# Patient Record
Sex: FEMALE | Race: WHITE | Employment: OTHER | URBAN - METROPOLITAN AREA
[De-identification: names, ages, dates, MRNs, and addresses within clinical notes are randomized per-mention and may not be internally consistent; named-entity substitution may affect disease eponyms.]

---

## 2017-01-10 ENCOUNTER — HOSPITAL ENCOUNTER (OUTPATIENT)
Dept: INFUSION THERAPY | Age: 82
End: 2017-01-10

## 2017-01-10 ENCOUNTER — APPOINTMENT (OUTPATIENT)
Dept: INFUSION THERAPY | Age: 82
End: 2017-01-10

## 2017-03-14 PROBLEM — I50.9 CONGESTIVE HEART FAILURE (HCC): Status: ACTIVE | Noted: 2017-03-14

## 2017-03-14 PROBLEM — M81.0 OSTEOPOROSIS, POSTMENOPAUSAL: Status: ACTIVE | Noted: 2017-03-14

## 2017-03-14 PROBLEM — G89.4 CHRONIC PAIN SYNDROME: Status: ACTIVE | Noted: 2017-03-14

## 2017-09-20 ENCOUNTER — HOSPITAL ENCOUNTER (OUTPATIENT)
Dept: INFUSION THERAPY | Age: 82
Discharge: HOME | End: 2017-09-20
Payer: MEDICARE

## 2017-09-20 VITALS
SYSTOLIC BLOOD PRESSURE: 148 MMHG | HEART RATE: 56 BPM | DIASTOLIC BLOOD PRESSURE: 65 MMHG | RESPIRATION RATE: 18 BRPM | TEMPERATURE: 97.8 F | OXYGEN SATURATION: 97 %

## 2017-09-20 PROCEDURE — 96372 THER/PROPH/DIAG INJ SC/IM: CPT

## 2017-09-20 PROCEDURE — 74011250636 HC RX REV CODE- 250/636: Performed by: FAMILY MEDICINE

## 2017-09-20 RX ADMIN — DENOSUMAB 60 MG: 60 INJECTION SUBCUTANEOUS at 13:24

## 2017-09-20 NOTE — PROGRESS NOTES
Arrived to the [facility]. Prolia completed. Patient tolerated well. Any issues or concerns during appointment: None. Discharged in wheelchair accompanied by grand-daugher.

## 2017-12-08 PROBLEM — M81.0 OSTEOPOROSIS, POSTMENOPAUSAL: Status: RESOLVED | Noted: 2017-03-14 | Resolved: 2017-12-08

## 2018-04-02 ENCOUNTER — HOSPITAL ENCOUNTER (OUTPATIENT)
Dept: INFUSION THERAPY | Age: 83
Discharge: HOME | End: 2018-04-02
Payer: MEDICARE

## 2018-04-02 VITALS
SYSTOLIC BLOOD PRESSURE: 183 MMHG | DIASTOLIC BLOOD PRESSURE: 77 MMHG | TEMPERATURE: 97.9 F | RESPIRATION RATE: 18 BRPM | HEART RATE: 58 BPM

## 2018-04-02 PROCEDURE — 74011250636 HC RX REV CODE- 250/636

## 2018-04-02 PROCEDURE — 96372 THER/PROPH/DIAG INJ SC/IM: CPT

## 2018-04-02 RX ADMIN — DENOSUMAB 60 MG: 60 INJECTION SUBCUTANEOUS at 14:08

## 2018-04-02 NOTE — PROGRESS NOTES
Arrived to the [facility]. Prolia completed. Patient tolerated well. Any issues or concerns during appointment: none. No future appointments scheduled with infusion. Discharged via w/c.

## 2018-05-30 ENCOUNTER — ANESTHESIA EVENT (OUTPATIENT)
Dept: SURGERY | Age: 83
End: 2018-05-30
Payer: MEDICARE

## 2018-05-31 ENCOUNTER — APPOINTMENT (OUTPATIENT)
Dept: GENERAL RADIOLOGY | Age: 83
End: 2018-05-31
Attending: ORTHOPAEDIC SURGERY
Payer: MEDICARE

## 2018-05-31 ENCOUNTER — ANESTHESIA (OUTPATIENT)
Dept: SURGERY | Age: 83
End: 2018-05-31
Payer: MEDICARE

## 2018-05-31 ENCOUNTER — HOSPITAL ENCOUNTER (OUTPATIENT)
Age: 83
Setting detail: OUTPATIENT SURGERY
Discharge: HOME | End: 2018-05-31
Attending: ORTHOPAEDIC SURGERY | Admitting: ORTHOPAEDIC SURGERY
Payer: MEDICARE

## 2018-05-31 VITALS
WEIGHT: 150 LBS | TEMPERATURE: 99.7 F | BODY MASS INDEX: 26.58 KG/M2 | HEIGHT: 63 IN | DIASTOLIC BLOOD PRESSURE: 60 MMHG | OXYGEN SATURATION: 94 % | RESPIRATION RATE: 18 BRPM | SYSTOLIC BLOOD PRESSURE: 120 MMHG | HEART RATE: 74 BPM

## 2018-05-31 LAB — POTASSIUM BLD-SCNC: 3.8 MMOL/L (ref 3.5–5.1)

## 2018-05-31 PROCEDURE — A4565 SLINGS: HCPCS | Performed by: ORTHOPAEDIC SURGERY

## 2018-05-31 PROCEDURE — 74011250636 HC RX REV CODE- 250/636: Performed by: ORTHOPAEDIC SURGERY

## 2018-05-31 PROCEDURE — 77030003602 HC NDL NRV BLK BBMI -B: Performed by: ANESTHESIOLOGY

## 2018-05-31 PROCEDURE — 76010000160 HC OR TIME 0.5 TO 1 HR INTENSV-TIER 1: Performed by: ORTHOPAEDIC SURGERY

## 2018-05-31 PROCEDURE — 77030000031 HC BIT DRL QC SYNT -C: Performed by: ORTHOPAEDIC SURGERY

## 2018-05-31 PROCEDURE — 76010010054 HC POST OP PAIN BLOCK: Performed by: ORTHOPAEDIC SURGERY

## 2018-05-31 PROCEDURE — 77030025318 HC WRE K 1 SYNT -B: Performed by: ORTHOPAEDIC SURGERY

## 2018-05-31 PROCEDURE — 76210000063 HC OR PH I REC FIRST 0.5 HR: Performed by: ORTHOPAEDIC SURGERY

## 2018-05-31 PROCEDURE — 76060000033 HC ANESTHESIA 1 TO 1.5 HR: Performed by: ORTHOPAEDIC SURGERY

## 2018-05-31 PROCEDURE — C1713 ANCHOR/SCREW BN/BN,TIS/BN: HCPCS | Performed by: ORTHOPAEDIC SURGERY

## 2018-05-31 PROCEDURE — 74011250636 HC RX REV CODE- 250/636

## 2018-05-31 PROCEDURE — 77030000032 HC CUF TRNQT ZIMM -B: Performed by: ORTHOPAEDIC SURGERY

## 2018-05-31 PROCEDURE — 77030002986 HC SUT PROL J&J -A: Performed by: ORTHOPAEDIC SURGERY

## 2018-05-31 PROCEDURE — 84132 ASSAY OF SERUM POTASSIUM: CPT

## 2018-05-31 PROCEDURE — 76210000021 HC REC RM PH II 0.5 TO 1 HR: Performed by: ORTHOPAEDIC SURGERY

## 2018-05-31 PROCEDURE — 77030002933 HC SUT MCRYL J&J -A: Performed by: ORTHOPAEDIC SURGERY

## 2018-05-31 PROCEDURE — 76942 ECHO GUIDE FOR BIOPSY: CPT | Performed by: ORTHOPAEDIC SURGERY

## 2018-05-31 PROCEDURE — 74011250636 HC RX REV CODE- 250/636: Performed by: ANESTHESIOLOGY

## 2018-05-31 PROCEDURE — 77030018836 HC SOL IRR NACL ICUM -A: Performed by: ORTHOPAEDIC SURGERY

## 2018-05-31 PROCEDURE — 77030008367 HC SPLNT ORTHGLS BSNM -A: Performed by: ORTHOPAEDIC SURGERY

## 2018-05-31 PROCEDURE — 77030039266 HC ADH SKN EXOFIN S2SG -A: Performed by: ORTHOPAEDIC SURGERY

## 2018-05-31 PROCEDURE — 77030003862 HC BIT DRL SYNT -B: Performed by: ORTHOPAEDIC SURGERY

## 2018-05-31 PROCEDURE — 77030011884 HC TAPE CST PLSTR BSNM -A: Performed by: ORTHOPAEDIC SURGERY

## 2018-05-31 DEVICE — SCREW BNE L12MM DIA2.7MM CORT S STL ST LOK FULL THRD T8: Type: IMPLANTABLE DEVICE | Site: WRIST | Status: FUNCTIONAL

## 2018-05-31 DEVICE — 2.4MM VA-LCP 2-CLMN VLR DSTL RADIUS PL 6H HD/3H SHAFT/RIGHT
Type: IMPLANTABLE DEVICE | Site: WRIST | Status: FUNCTIONAL
Brand: VA-LCP

## 2018-05-31 DEVICE — 2.4MM VA LOCKING SCREW STARDRIVE 18MM: Type: IMPLANTABLE DEVICE | Site: WRIST | Status: FUNCTIONAL

## 2018-05-31 DEVICE — 2.7MM CORTEX SCREW SLF-TPNG WITH T8 STARDRIVE RECESS 14MM: Type: IMPLANTABLE DEVICE | Site: WRIST | Status: FUNCTIONAL

## 2018-05-31 DEVICE — SCREW BNE L12MM DIA2.4MM DST RAD VOLAR S STL ST VAR ANG LOK: Type: IMPLANTABLE DEVICE | Site: WRIST | Status: FUNCTIONAL

## 2018-05-31 RX ORDER — SODIUM CHLORIDE 0.9 % (FLUSH) 0.9 %
5-10 SYRINGE (ML) INJECTION AS NEEDED
Status: DISCONTINUED | OUTPATIENT
Start: 2018-05-31 | End: 2018-05-31 | Stop reason: HOSPADM

## 2018-05-31 RX ORDER — ACETAMINOPHEN 500 MG
1000 TABLET ORAL
Status: DISCONTINUED | OUTPATIENT
Start: 2018-05-31 | End: 2018-05-31 | Stop reason: HOSPADM

## 2018-05-31 RX ORDER — SODIUM CHLORIDE, SODIUM LACTATE, POTASSIUM CHLORIDE, CALCIUM CHLORIDE 600; 310; 30; 20 MG/100ML; MG/100ML; MG/100ML; MG/100ML
150 INJECTION, SOLUTION INTRAVENOUS CONTINUOUS
Status: DISCONTINUED | OUTPATIENT
Start: 2018-05-31 | End: 2018-05-31 | Stop reason: HOSPADM

## 2018-05-31 RX ORDER — HYDROMORPHONE HYDROCHLORIDE 2 MG/ML
0.5 INJECTION, SOLUTION INTRAMUSCULAR; INTRAVENOUS; SUBCUTANEOUS
Status: DISCONTINUED | OUTPATIENT
Start: 2018-05-31 | End: 2018-05-31 | Stop reason: HOSPADM

## 2018-05-31 RX ORDER — HYDROCODONE BITARTRATE AND ACETAMINOPHEN 5; 325 MG/1; MG/1
1 TABLET ORAL AS NEEDED
Status: DISCONTINUED | OUTPATIENT
Start: 2018-05-31 | End: 2018-05-31 | Stop reason: HOSPADM

## 2018-05-31 RX ORDER — FAMOTIDINE 20 MG/1
20 TABLET, FILM COATED ORAL ONCE
Status: DISCONTINUED | OUTPATIENT
Start: 2018-05-31 | End: 2018-05-31 | Stop reason: HOSPADM

## 2018-05-31 RX ORDER — CEFAZOLIN SODIUM/WATER 2 G/20 ML
2 SYRINGE (ML) INTRAVENOUS ONCE
Status: COMPLETED | OUTPATIENT
Start: 2018-05-31 | End: 2018-05-31

## 2018-05-31 RX ORDER — DEXAMETHASONE SODIUM PHOSPHATE 4 MG/ML
INJECTION, SOLUTION INTRA-ARTICULAR; INTRALESIONAL; INTRAMUSCULAR; INTRAVENOUS; SOFT TISSUE AS NEEDED
Status: DISCONTINUED | OUTPATIENT
Start: 2018-05-31 | End: 2018-05-31 | Stop reason: HOSPADM

## 2018-05-31 RX ORDER — SODIUM CHLORIDE 0.9 % (FLUSH) 0.9 %
5-10 SYRINGE (ML) INJECTION EVERY 8 HOURS
Status: DISCONTINUED | OUTPATIENT
Start: 2018-05-31 | End: 2018-05-31 | Stop reason: HOSPADM

## 2018-05-31 RX ORDER — FENTANYL CITRATE 50 UG/ML
100 INJECTION, SOLUTION INTRAMUSCULAR; INTRAVENOUS ONCE
Status: DISCONTINUED | OUTPATIENT
Start: 2018-05-31 | End: 2018-05-31 | Stop reason: HOSPADM

## 2018-05-31 RX ORDER — LIDOCAINE HYDROCHLORIDE 10 MG/ML
0.1 INJECTION INFILTRATION; PERINEURAL AS NEEDED
Status: DISCONTINUED | OUTPATIENT
Start: 2018-05-31 | End: 2018-05-31 | Stop reason: HOSPADM

## 2018-05-31 RX ORDER — SODIUM CHLORIDE 9 MG/ML
50 INJECTION, SOLUTION INTRAVENOUS CONTINUOUS
Status: DISCONTINUED | OUTPATIENT
Start: 2018-05-31 | End: 2018-05-31 | Stop reason: HOSPADM

## 2018-05-31 RX ORDER — MIDAZOLAM HYDROCHLORIDE 1 MG/ML
2 INJECTION, SOLUTION INTRAMUSCULAR; INTRAVENOUS
Status: DISCONTINUED | OUTPATIENT
Start: 2018-05-31 | End: 2018-05-31 | Stop reason: HOSPADM

## 2018-05-31 RX ADMIN — Medication 2 G: at 10:43

## 2018-05-31 RX ADMIN — SODIUM CHLORIDE, SODIUM LACTATE, POTASSIUM CHLORIDE, AND CALCIUM CHLORIDE 150 ML/HR: 600; 310; 30; 20 INJECTION, SOLUTION INTRAVENOUS at 08:00

## 2018-05-31 RX ADMIN — DEXAMETHASONE SODIUM PHOSPHATE 4 MG: 4 INJECTION, SOLUTION INTRA-ARTICULAR; INTRALESIONAL; INTRAMUSCULAR; INTRAVENOUS; SOFT TISSUE at 08:38

## 2018-05-31 NOTE — IP AVS SNAPSHOT
[address] 
[phone] Patient: Germaine Sainz MRN: [id number] :[id number] A check kathya indicates which time of day the medication should be taken. My Medications ASK your doctor about these medications Instructions Each Dose to Equal  
 Morning Noon Evening Bedtime  
 aspirin 81 mg chewable tablet Your last dose was: Your next dose is: Take 81 mg by mouth every other day. 81 mg  
    
   
   
   
  
 atorvastatin 20 mg tablet Commonly known as:  LIPITOR Your last dose was: Your next dose is: Take 1 Tab by mouth daily. 20 mg  
    
   
   
   
  
 calcium carbonate 500 mg calcium (1,250 mg) tablet Commonly known as:  OS-EMERITA Your last dose was: Your next dose is: Take  by mouth two (2) times a day. denosumab 60 mg/mL injection Commonly known as:  Venkatesh Álvareza Your last dose was: Your next dose is:    
   
   
 1 mL by SubCUTAneous route once for 1 dose. Every 6 months, last shot 16        May draw labs if needed     Original order is good for 6 mths  Indications: POST-MENOPAUSAL OSTEOPOROSIS  
 60 mg  
    
   
   
   
  
 furosemide 20 mg tablet Commonly known as:  LASIX Your last dose was: Your next dose is: Take 1 tablet daily, alternate with 1/2 tablet daily. levothyroxine 50 mcg tablet Commonly known as:  SYNTHROID Your last dose was: Your next dose is: Take 1 Tab by mouth Daily (before breakfast). 50 mcg  
    
   
   
   
  
 lisinopril 10 mg tablet Commonly known as:  Juliana Drought Your last dose was: Your next dose is: Take 1 Tab by mouth daily. 10 mg LORazepam 0.5 mg tablet Commonly known as:  ATIVAN  
   
 Your last dose was: Your next dose is: Take 1 Tab by mouth nightly. Max Daily Amount: 0.5 mg.  
 0.5 mg  
    
   
   
   
  
 magnesium 250 mg Tab Your last dose was: Your next dose is: Take  by mouth.  
     
   
   
   
  
 metoprolol tartrate 25 mg tablet Commonly known as:  LOPRESSOR Your last dose was: Your next dose is: Take 1 Tab by mouth two (2) times a day. 25 mg  
    
   
   
   
  
 montelukast 10 mg tablet Commonly known as:  SINGULAIR Your last dose was: Your next dose is: Take 1 Tab by mouth nightly. 10 mg  
    
   
   
   
  
 omeprazole 20 mg capsule Commonly known as:  PRILOSEC Your last dose was: Your next dose is: Take 1 Cap by mouth daily. 20 mg  
    
   
   
   
  
 potassium chloride 20 mEq tablet Commonly known as:  K-DUR, KLOR-CON Your last dose was: Your next dose is: Take 1 Tab by mouth daily. 20 mEq VENTOLIN HFA 90 mcg/actuation inhaler Generic drug:  albuterol Your last dose was: Your next dose is: Take 2 Puffs by inhalation every four (4) hours as needed. 2 Puff VITAMIN D3 1,000 unit Cap Generic drug:  cholecalciferol Your last dose was: Your next dose is: Take 1 Tab by mouth daily. 1 Tab  
    
   
   
   
  
 vitamin E acetate 400 unit Cap capsule Your last dose was: Your next dose is: Take  by mouth daily.

## 2018-05-31 NOTE — DISCHARGE INSTRUCTIONS
· ACTIVITY  ·  Must wear sling for support and comfort. · Elevate, ice and wiggle fingers. · May bathe or shower but must keep dressing dry and intact. DIET  · Clear liquids until no nausea or vomiting; then light diet for the first day. · Advance to regular diet on second day, unless your doctor orders otherwise. · If nausea and vomiting continues, call your doctor. · Avoid greasy and spicy food today to reduce nausea. PAIN  · Take pain medication as directed by your doctor. · Call your doctor if pain is NOT relieved by medication. · DO NOT take aspirin of blood thinners unless directed by your doctor. · Take pain pills with food to reduce nausea  · Pain pills may cause constipation. May use stool softener. · Begin taking pain pills when sensation returns or at bedtime even if still numb    DRESSING CARE : Keep splint dry and intact. Wearing a Cast: Care Instructions  Your Care Instructions  A cast protects a broken bone or other injury. Most casts are made of fiberglass, but plaster casts are still sometimes used. Once a cast is on, you can't remove it yourself. Your doctor will take it off. Follow-up care is a key part of your treatment and safety. Be sure to make and go to all appointments, and call your doctor if you are having problems. It's also a good idea to know your test results and keep a list of the medicines you take. How can you care for yourself at home? General care  · Follow your doctor's instructions for when you can first put weight on the cast. Fiberglass casts dry quickly and are soon ready to bear weight. But plaster casts may take several days before they are hard enough to use. When it's okay to put weight on your cast, do not stand or walk on it unless it is designed for walking. · Prop up the injured arm or leg on a pillow anytime you sit or lie down during the next 3 days. Try to keep it above the level of your heart. This will help reduce swelling.   · If the fingers or toes on the limb with the cast were not injured, wiggle them every now and then. This helps move the blood and fluids in the injured limb. · Be safe with medicines. Read and follow all instructions on the label. ¨ If the doctor gave you a prescription medicine for pain, take it as prescribed. ¨ If you are not taking a prescription pain medicine, ask your doctor if you can take an over-the-counter medicine. · Keep up your muscle strength and tone as much as you can while protecting your injured limb or joint. Your doctor may want you to tense and relax the muscles protected by the cast. Check with your doctor or your physical or occupational therapist for instructions. Water and your cast  · Keep your cast dry. · Tape a sheet of plastic to cover your cast when you take a shower or bath or when you have any other contact with water. Moisture can collect under the cast and cause skin irritation and itching. It can make infection more likely if you have had surgery or have a wound under the cast.  Cast and skin care  · Try blowing cool air from a hair dryer or fan into the cast to help relieve itching. Never stick items under your cast to scratch the skin. · Don't use oils or lotions near your cast. If the skin gets red or irritated around the edge of the cast, you may pad the edges with a soft material or use tape to cover them. When should you call for help? Call your doctor now or seek immediate medical care if:  ? · You have increased or severe pain. ? · You feel a warm or painful spot under the cast.   ? · You have problems with your cast. For example:  ¨ The skin under the cast burns or stings. ¨ The cast feels too tight. ¨ There is a lot of swelling near the cast. (Some swelling is normal.)  ¨ You have a new fever. ¨ There is drainage or a bad smell coming from the cast.   ? · Your foot or hand is cool or pale or changes color. ? · You have trouble moving your fingers or toes.    ? · You have symptoms of a blood clot in your arm or leg (called a deep vein thrombosis). These may include:  ¨ Pain in the arm, calf, back of the knee, thigh, or groin. ¨ Redness and swelling in the arm, leg, or groin. ? Watch closely for changes in your health, and be sure to contact your doctor if:  ? · The cast is breaking apart. ? · You are not getting better as expected. Where can you learn more? Go to http://vincent-anson.info/. Enter 454 [id number] in the search box to learn more about \"Wearing a Cast: Care Instructions. \"  Current as of: March 21, 2017  Content Version: 11.4  © 9525-7239 [employer]. Care instructions adapted under license by [employer] (which disclaims liability or warranty for this information). If you have questions about a medical condition or this instruction, always ask your healthcare professional. [place] [address] any warranty or liability for your use of this information. CALL YOUR DOCTOR IF   · Excessive bleeding that does not stop after holding pressure over the area  · Temperature of 101 degrees F or above  · Excessive redness, swelling or bruising, and/ or green or yellow, smelly discharge from incision    AFTER ANESTHESIA   · For the first 24 hours: DO NOT Drive, Drink alcoholic beverages, or Make important decisions. · Be aware of dizziness following anesthesia and while taking pain medication. APPOINTMENT DATE/ TIME: June 13, 2018 at 10:45 a.mm.  At [place] [address] office    Kristy Lynch DOCTOR'S PHONE NUMBER [phone]      DISCHARGE SUMMARY from Nurse    PATIENT INSTRUCTIONS:    After general anesthesia or intravenous sedation, for 24 hours or while taking prescription Narcotics:  · Limit your activities  · Do not drive and operate hazardous machinery  · Do not make important personal or business decisions  · Do  not drink alcoholic beverages  · If you have not urinated within 8 hours after discharge, please contact your surgeon on call. *  Please give a list of your current medications to your Primary Care Provider. *  Please update this list whenever your medications are discontinued, doses are      changed, or new medications (including over-the-counter products) are added. *  Please carry medication information at all times in case of emergency situations. These are general instructions for a healthy lifestyle:    No smoking/ No tobacco products/ Avoid exposure to second hand smoke    Surgeon General's Warning:  Quitting smoking now greatly reduces serious risk to your health. Obesity, smoking, and sedentary lifestyle greatly increases your risk for illness    A healthy diet, regular physical exercise & weight monitoring are important for maintaining a healthy lifestyle    You may be retaining fluid if you have a history of heart failure or if you experience any of the following symptoms:  Weight gain of 3 pounds or more overnight or 5 pounds in a week, increased swelling in our hands or feet or shortness of breath while lying flat in bed. Please call your doctor as soon as you notice any of these symptoms; do not wait until your next office visit. Recognize signs and symptoms of STROKE:    F-face looks uneven    A-arms unable to move or move unevenly    S-speech slurred or non-existent    T-time-call 911 as soon as signs and symptoms begin-DO NOT go       Back to bed or wait to see if you get better-TIME IS BRAIN.

## 2018-05-31 NOTE — ANESTHESIA PROCEDURE NOTES
Peripheral Block    Start time: 5/31/2018 8:30 AM  End time: 5/31/2018 8:38 AM  Performed by: Abelardo Combs  Authorized by: Abelardo Combs       Pre-procedure: Indications: at surgeon's request and post-op pain management    Preanesthetic Checklist: patient identified, risks and benefits discussed, site marked, timeout performed, anesthesia consent given and patient being monitored    Timeout Time: 08:30          Block Type:   Block Type:  Supraclavicular  Laterality:  Right  Monitoring:  Standard ASA monitoring, responsive to questions, continuous pulse ox, oxygen, frequent vital sign checks and heart rate  Injection Technique:  Single shot  Procedures: ultrasound guided and nerve stimulator    Patient Position: seated  Prep: chlorhexidine    Location:  Supraclavicular  Needle Type:  Stimuplex  Needle Gauge:  22 G  Needle Localization:  Nerve stimulator and ultrasound guidance  Motor Response: minimal motor response >0.4 mA    Medication Injected:  0.5%  ropivacaine  Adds:  Epi 1:200K  Volume (mL):  30    Assessment:  Number of attempts:  1  Injection Assessment:  Incremental injection every 5 mL, negative aspiration for CSF, local visualized surrounding nerve on ultrasound, negative aspiration for blood, no paresthesia, no intravascular symptoms and ultrasound image on chart  Patient tolerance:  Patient tolerated the procedure well with no immediate complications  All needles out intact, procedure tolerated well.

## 2018-05-31 NOTE — ANESTHESIA PREPROCEDURE EVALUATION
Anesthetic History   No history of anesthetic complications            Review of Systems / Medical History  Patient summary reviewed and pertinent labs reviewed    Pulmonary    COPD      Smoker (uit 25 years.)         Neuro/Psych   Within defined limits           Cardiovascular    Hypertension: well controlled        Dysrhythmias : atrial fibrillation  Hyperlipidemia    Exercise tolerance: <4 METS     GI/Hepatic/Renal     GERD: well controlled           Endo/Other      Hypothyroidism: well controlled       Other Findings              Physical Exam    Airway  Mallampati: II  TM Distance: 4 - 6 cm  Neck ROM: normal range of motion   Mouth opening: Diminished (comment)     Cardiovascular    Rhythm: regular  Rate: normal    Murmur: Grade 2, Aortic area     Dental    Dentition: Full lower dentures, Full upper dentures and Edentulous     Pulmonary  Breath sounds clear to auscultation               Abdominal         Other Findings            Anesthetic Plan    ASA: 3  Anesthesia type: total IV anesthesia      Post-op pain plan if not by surgeon: peripheral nerve block single    Induction: Intravenous  Anesthetic plan and risks discussed with: Patient and Son / Daughter

## 2018-05-31 NOTE — ANESTHESIA POSTPROCEDURE EVALUATION
Post-Anesthesia Evaluation and Assessment    Patient: Frank Whelan MRN: [id number]  SSN: [id number]    YOB: 1925  Age: 80 y.o. Sex: female       Cardiovascular Function/Vital Signs  Visit Vitals    /68    Pulse 76    Temp 37.6 °C (99.7 °F)    Resp 17    Ht 5' 3\" (1.6 m)    Wt 68 kg (150 lb)    SpO2 93%    BMI 26.57 kg/m2       Patient is status post total IV anesthesia anesthesia for Procedure(s):  RIGHT DISTAL RADIUS OPEN REDUCTION INTERNAL FIXATION . Nausea/Vomiting: None    Postoperative hydration reviewed and adequate. Pain:  Pain Scale 1: Numeric (0 - 10) (05/31/18 1144)  Pain Intensity 1: 0 (05/31/18 1144)   Managed    Neurological Status:   Neuro (WDL): Exceptions to WDL (05/31/18 1144)  Neuro  Neurologic State: Alert (05/31/18 1144)  LUE Motor Response: Purposeful (05/31/18 1144)  LLE Motor Response: Purposeful (05/31/18 1144)  RUE Motor Response: Numbness; Pharmocologically paralyzed (05/31/18 1144)  RLE Motor Response: Purposeful (05/31/18 1144)   At baseline    Mental Status and Level of Consciousness: Arousable    Pulmonary Status:   O2 Device: Room air (05/31/18 1157)   Adequate oxygenation and airway patent    Complications related to anesthesia: None    Post-anesthesia assessment completed.  No concerns    Signed By: Ana Smith MD     May 31, 2018

## 2018-06-11 NOTE — OP NOTES
Operative Report       DOS:  5/31/18    Preoperative diagnosis:  Closed fracture distal radius and ulna, right, initial encounter [S52.190A, S52.476T]    Postoperative diagnosis: Closed Fracture Right Distal Radius     Surgeon(s) and Role:     * Lorelei Singletary MD - Primary     Anesthesia: Regional Local with MAC. Procedures: Procedure(s):  RIGHT DISTAL RADIUS OPEN REDUCTION INTERNAL FIXATION  2 PART INTRA ARTICULAR    EBL/IV FLUIDS: Per Anesthesia. COMPLICATIONS: None. DISPOSITION: Stable to recovery room. INDICATIONS FOR PROCEDURE: The patient is a pleasant 80year-old female with history of right displaced distal radius fracture. After both operative and nonoperative treatment options were discussed, the decision was made to go ahead with a right distal radius ORIF. Risks and benefits of the procedure were discussed including, but not limited to bleeding, infection, injury to adjacent structures consisting of tendon, artery, and nerve, need for additional procedures, wound dehiscence, scar formation, failure of hardware, need for removal of hardware, irritation of tendon from hardware, malunion, nonunion, incomplete resolution of symptoms, recurrence of symptoms, and transient neuropraxia. Informed consent was obtained. PROCEDURE IN DETAIL: The patient was seen and marked in the preoperative suite. The patient was taken back to the OR, placed on the table in supine position with right upper extremities on hand tables. Right upper extremities were prepped and draped in standard sterile fashion. A formal timeout was performed confirming patient identification, preoperative antibiotics, and planned operative procedure. Prior to this the patient underwent a nerve block. We exsanguinated the left upper extremity and the tourniquet was placed up to 250 mmHg. a standard FCR incision was made dissecting down through the FCR bed.   Retracted the FPL and incised the pronator quadratus and standard L fashion. This was indeed an intra-articular split the articular surface was reduced. Standard distal radius plate placed. Pins were placed subchondral an adequate reduction of the fracture was made. Non-locking screw for the oblong screw hole. Variable angle locking screws for the distal screws. Locking screws for the remainder of the shaft screws. We did perform a Z lengthening brachial radialis release to facilitate reduction. Final radiographs show adequate reduction of the fracture and placement of the hardware. We irrigated copiously with normal saline. Closed the brachioradialis repair. Closed with subcutaneous Monocryl and a running subcuticular Prolene and Dermabond glue. Soft sterile dressing was placed a short volar wrist splint was placed since the DRUJ was stable. Packet was let down and fingers pinked up nicely. Patient was taken to the recovery room having tolerated the procedure well. POSTOPERATIVE CARE: Early motion. No heavy lifting.  Followup in 2 weeks for suture removal.    Closure: Primary    Complications: None     Signed By: Martina Lance MD

## 2018-06-20 RX ORDER — CEFAZOLIN SODIUM/WATER 2 G/20 ML
2 SYRINGE (ML) INTRAVENOUS ONCE
Status: CANCELLED | OUTPATIENT
Start: 2018-06-20 | End: 2018-06-20

## 2018-06-26 ENCOUNTER — HOSPITAL ENCOUNTER (OUTPATIENT)
Dept: SURGERY | Age: 83
Discharge: HOME | End: 2018-06-26
Payer: MEDICARE

## 2018-06-26 ENCOUNTER — HOSPITAL ENCOUNTER (OUTPATIENT)
Dept: PHYSICAL THERAPY | Age: 83
Discharge: HOME | End: 2018-06-26
Payer: MEDICARE

## 2018-06-26 VITALS
BODY MASS INDEX: 24.8 KG/M2 | DIASTOLIC BLOOD PRESSURE: 70 MMHG | OXYGEN SATURATION: 95 % | TEMPERATURE: 96.7 F | HEART RATE: 60 BPM | WEIGHT: 140 LBS | SYSTOLIC BLOOD PRESSURE: 147 MMHG | HEIGHT: 63 IN

## 2018-06-26 LAB
ALBUMIN SERPL-MCNC: 3.3 G/DL (ref 3.2–4.6)
ANION GAP SERPL CALC-SCNC: 9 MMOL/L (ref 7–16)
APPEARANCE UR: CLEAR
APTT PPP: 31.9 SEC (ref 23.2–35.3)
ATRIAL RATE: 56 BPM
BACTERIA SPEC CULT: ABNORMAL
BACTERIA URNS QL MICRO: ABNORMAL /HPF
BASOPHILS # BLD: 0 K/UL (ref 0–0.2)
BASOPHILS NFR BLD: 0 % (ref 0–2)
BILIRUB UR QL: NEGATIVE
BUN SERPL-MCNC: 17 MG/DL (ref 8–23)
CALCIUM SERPL-MCNC: 9.3 MG/DL (ref 8.3–10.4)
CALCULATED P AXIS, ECG09: 70 DEGREES
CALCULATED R AXIS, ECG10: -34 DEGREES
CALCULATED T AXIS, ECG11: 0 DEGREES
CASTS URNS QL MICRO: ABNORMAL /LPF
CHLORIDE SERPL-SCNC: 102 MMOL/L (ref 98–107)
CO2 SERPL-SCNC: 28 MMOL/L (ref 21–32)
COLOR UR: YELLOW
CREAT SERPL-MCNC: 1.61 MG/DL (ref 0.6–1)
DIAGNOSIS, 93000: NORMAL
DIFFERENTIAL METHOD BLD: ABNORMAL
EOSINOPHIL # BLD: 0.2 K/UL (ref 0–0.8)
EOSINOPHIL NFR BLD: 2 % (ref 0.5–7.8)
EPI CELLS #/AREA URNS HPF: ABNORMAL /HPF
ERYTHROCYTE [DISTWIDTH] IN BLOOD BY AUTOMATED COUNT: 14.7 % (ref 11.9–14.6)
EST. AVERAGE GLUCOSE BLD GHB EST-MCNC: 111 MG/DL
GLUCOSE SERPL-MCNC: 93 MG/DL (ref 65–100)
GLUCOSE UR STRIP.AUTO-MCNC: NEGATIVE MG/DL
HBA1C MFR BLD: 5.5 % (ref 4.8–6)
HCT VFR BLD AUTO: 39.9 % (ref 35.8–46.3)
HGB BLD-MCNC: 12.9 G/DL (ref 11.7–15.4)
HGB UR QL STRIP: NEGATIVE
IMM GRANULOCYTES # BLD: 0 K/UL (ref 0–0.5)
IMM GRANULOCYTES NFR BLD AUTO: 0 % (ref 0–5)
INR PPP: 1
KETONES UR QL STRIP.AUTO: NEGATIVE MG/DL
LEUKOCYTE ESTERASE UR QL STRIP.AUTO: ABNORMAL
LYMPHOCYTES # BLD: 2 K/UL (ref 0.5–4.6)
LYMPHOCYTES NFR BLD: 26 % (ref 13–44)
MCH RBC QN AUTO: 28.9 PG (ref 26.1–32.9)
MCHC RBC AUTO-ENTMCNC: 32.3 G/DL (ref 31.4–35)
MCV RBC AUTO: 89.3 FL (ref 79.6–97.8)
MONOCYTES # BLD: 0.5 K/UL (ref 0.1–1.3)
MONOCYTES NFR BLD: 7 % (ref 4–12)
NEUTS SEG # BLD: 5.1 K/UL (ref 1.7–8.2)
NEUTS SEG NFR BLD: 65 % (ref 43–78)
NITRITE UR QL STRIP.AUTO: NEGATIVE
P-R INTERVAL, ECG05: 172 MS
PH UR STRIP: 5.5 [PH] (ref 5–9)
PLATELET # BLD AUTO: 239 K/UL (ref 150–450)
PMV BLD AUTO: 11.7 FL (ref 10.8–14.1)
POTASSIUM SERPL-SCNC: 4.6 MMOL/L (ref 3.5–5.1)
PROT UR STRIP-MCNC: NEGATIVE MG/DL
PROTHROMBIN TIME: 13.6 SEC (ref 11.5–14.5)
Q-T INTERVAL, ECG07: 424 MS
QRS DURATION, ECG06: 102 MS
QTC CALCULATION (BEZET), ECG08: 409 MS
RBC # BLD AUTO: 4.47 M/UL (ref 4.05–5.25)
RBC #/AREA URNS HPF: ABNORMAL /HPF
SERVICE CMNT-IMP: ABNORMAL
SODIUM SERPL-SCNC: 139 MMOL/L (ref 136–145)
SP GR UR REFRACTOMETRY: 1.01 (ref 1–1.02)
UROBILINOGEN UR QL STRIP.AUTO: 0.2 EU/DL (ref 0.2–1)
VENTRICULAR RATE, ECG03: 56 BPM
WBC # BLD AUTO: 7.9 K/UL (ref 4.3–11.1)
WBC URNS QL MICRO: ABNORMAL /HPF

## 2018-06-26 PROCEDURE — 93005 ELECTROCARDIOGRAM TRACING: CPT | Performed by: ORTHOPAEDIC SURGERY

## 2018-06-26 PROCEDURE — 36415 COLL VENOUS BLD VENIPUNCTURE: CPT | Performed by: ORTHOPAEDIC SURGERY

## 2018-06-26 PROCEDURE — 87086 URINE CULTURE/COLONY COUNT: CPT | Performed by: ORTHOPAEDIC SURGERY

## 2018-06-26 PROCEDURE — 85730 THROMBOPLASTIN TIME PARTIAL: CPT | Performed by: ORTHOPAEDIC SURGERY

## 2018-06-26 PROCEDURE — G8980 MOBILITY D/C STATUS: HCPCS

## 2018-06-26 PROCEDURE — 87088 URINE BACTERIA CULTURE: CPT | Performed by: ORTHOPAEDIC SURGERY

## 2018-06-26 PROCEDURE — G8978 MOBILITY CURRENT STATUS: HCPCS

## 2018-06-26 PROCEDURE — 80307 DRUG TEST PRSMV CHEM ANLYZR: CPT | Performed by: ORTHOPAEDIC SURGERY

## 2018-06-26 PROCEDURE — 83036 HEMOGLOBIN GLYCOSYLATED A1C: CPT | Performed by: ORTHOPAEDIC SURGERY

## 2018-06-26 PROCEDURE — 85025 COMPLETE CBC W/AUTO DIFF WBC: CPT | Performed by: ORTHOPAEDIC SURGERY

## 2018-06-26 PROCEDURE — 87641 MR-STAPH DNA AMP PROBE: CPT | Performed by: ORTHOPAEDIC SURGERY

## 2018-06-26 PROCEDURE — 85610 PROTHROMBIN TIME: CPT | Performed by: ORTHOPAEDIC SURGERY

## 2018-06-26 PROCEDURE — G8979 MOBILITY GOAL STATUS: HCPCS

## 2018-06-26 PROCEDURE — 81001 URINALYSIS AUTO W/SCOPE: CPT | Performed by: ORTHOPAEDIC SURGERY

## 2018-06-26 PROCEDURE — 87186 SC STD MICRODIL/AGAR DIL: CPT | Performed by: ORTHOPAEDIC SURGERY

## 2018-06-26 PROCEDURE — 97161 PT EVAL LOW COMPLEX 20 MIN: CPT

## 2018-06-26 PROCEDURE — 82040 ASSAY OF SERUM ALBUMIN: CPT | Performed by: ORTHOPAEDIC SURGERY

## 2018-06-26 PROCEDURE — 80048 BASIC METABOLIC PNL TOTAL CA: CPT | Performed by: ORTHOPAEDIC SURGERY

## 2018-06-26 PROCEDURE — 77030027138 HC INCENT SPIROMETER -A

## 2018-06-26 RX ORDER — THEOPHYLLINE 300 MG/1
300 TABLET, EXTENDED RELEASE ORAL 2 TIMES DAILY
COMMUNITY
End: 2019-07-11

## 2018-06-26 RX ORDER — ASPIRIN 81 MG/1
81 TABLET ORAL EVERY OTHER DAY
COMMUNITY
End: 2018-07-12

## 2018-06-26 NOTE — PROGRESS NOTES
Frank Whelan  : (47 y.o.) [facility]  [address], [address]  [phone]       Physical Therapy Prehab Plan of Treatment and Evaluation Summary:2018    ICD-10: Treatment Diagnosis:   · Pain in Right Knee (M25.561)  · Stiffness of Right Knee, Not elsewhere classified (M25.661)  · Difficulty in walking, Not elsewhere classified (R26.2)  · Other abnormalities of gait and mobility (R26.89)  Precautions/Allergies:   Codeine; Phenobarbital; and Tramadol  MEDICAL/REFERRING DIAGNOSIS:  Unilateral primary osteoarthritis, right knee [M17.11]  REFERRING PHYSICIAN: Tobias Hernandez MD  DATE OF SURGERY: 18   Assessment:   Comments:  Pain in right knee joint with decreased independence with functional mobility. Pt plans to go to rehab following hospital stay. Pt was walking with a walker prior to a fall in May of this year. Pt's daughter and granddaughter are present and supportive. PROBLEM LIST (Impacting functional limitations):  Ms. Parth Lipscomb presents with the following right lower extremity(s) problems:  1. Transfers  2. Gait  3. Strength  4. Range of Motion  5. Pain   INTERVENTIONS PLANNED:  1. Home Exercise Program  2. Educational Discussion     TREATMENT PLAN: Effective Dates: 2018 TO 2018. Frequency/Duration: Patient to continue to perform home exercise program at least twice per day up until her surgery. GOALS: (Goals have been discussed and agreed upon with patient.)  Discharge Goals: Time Frame: 1 Day  1. Patient will demonstrate independence with a home exercise program designed to increase strength, range of motion and pain control to minimize functional deficits and optimize patient for total joint replacement. Rehabilitation Potential For Stated Goals: Good  Regarding Nolvia Vila's therapy, I certify that the treatment plan above will be carried out by a therapist or under their direction.   Thank you for this referral,  Drinda Cockayne, PT               HISTORY:   Present Symptoms:  Pain Intensity 1: 5  Pain Location 1: Knee  Pain Orientation 1: Right   History of Present Injury/Illness (Reason for Referral):  Medical/Referring Diagnosis: Unilateral primary osteoarthritis, right knee [M17.11]   Past Medical History/Comorbidities:   Ms. Mitch Malik  has a past medical history of Breast CA ([facility] [address] Cardiac arrhythmia (03/2013); Chronic obstructive pulmonary disease (HCC); GERD (gastroesophageal reflux disease); Hypertension; Migraine; Oxygen dependent; and Thyroid disease. She also has no past medical history of Adverse effect of anesthesia; Difficult intubation; Malignant hyperthermia due to anesthesia; Nausea & vomiting; or Pseudocholinesterase deficiency. Ms. Mitch Malik  has a past surgical history that includes hx hysterectomy; hx appendectomy; hx splenectomy; hx cholecystectomy; and hx breast lumpectomy (Right, 10/31/2012).   Social History/Living Environment:   Home Environment: Private residence  # Steps to Enter: 1  One/Two Story Residence: One story  Support Systems: Child(eric)  Patient Expects to be Discharged to[de-identified] Rehabilitation facility  Current DME Used/Available at Home: Walker, rolling, Cane, straight, Commode, bedside  Tub or Shower Type: Tub/Shower combination  Work/Activity:  retired  Dominant Side:  RIGHT  Current Medications:  See Pre-assessment nursing note   Number of Personal Factors/Comorbidities that affect the Plan of Care: 0: LOW COMPLEXITY   EXAMINATION:   ADLs (Current Functional Status):   Ambulation:  [] Independent  [] Walk Indoors Only  [] Walk Outdoors  [] Use Assistive Device  [x] Use Wheelchair Only Dressing:  [] Independent  Requires Assistance from Someone for:  [] Sock/Shoes  [x] Pants  [] Everything   Bathing/Showering:   [x] Independent  [] Requires Assistance from Someone  [] [address]:  [] Routine house and yard work  [] Light Housework Only  [x] None Observation/Orthostatic Postural Assessment:   Within defined limits   ROM/Flexibility:   Gross Assessment: Yes  AROM: Generally decreased, functional                LLE Assessment  LLE Assessment (WDL): Within defined limits      RLE AROM  R Knee Flexion: 85  R Knee Extension: 25   Strength:   Gross Assessment: Yes  Strength: Generally decreased, functional                  Functional Mobility:    Gross Assessment: Yes  Coordination: Generally decreased, functional    Gait Description (WDL): Exceptions to WDL  Stand to Sit: Modified independent  Sit to Stand: Modified independent  Distance (ft): 1 Feet (ft) (in wheelchair; pt does not walk due to pain)  Ambulation - Level of Assistance: Modified independent  Gait Abnormalities: Antalgic          Balance:    Sitting: Intact  Standing: Pull to stand, With support   Body Structures Involved:  1. Bones  2. Joints  3. Muscles  4. Ligaments Body Functions Affected:  1. Neuromusculoskeletal  2. Movement Related Activities and Participation Affected:  1. Mobility   Number of elements that affect the Plan of Care: 4+: HIGH COMPLEXITY   CLINICAL PRESENTATION:   Presentation: Stable and uncomplicated: LOW COMPLEXITY   CLINICAL DECISION MAKING:   Outcome Measure: Tool Used: Lower Extremity Functional Scale (LEFS)  Score:  Initial: 11/80 Most Recent: X/80 (Date: -- )   Interpretation of Score: 20 questions each scored on a 5 point scale with 0 representing \"extreme difficulty or unable to perform\" and 4 representing \"no difficulty\". The lower the score, the greater the functional disability. 80/80 represents no disability. Minimal detectable change is 9 points. Score 80 79-65 64-49 48-33 32-17 16-1 0   Modifier CH CI CJ CK CL CM CN     ?  Mobility - Walking and Moving Around:     - CURRENT STATUS: CM - 80%-99% impaired, limited or restricted    - GOAL STATUS: CM - 80%-99% impaired, limited or restricted    - D/C STATUS:  CM - 80%-99% impaired, limited or restricted  Medical Necessity:   · Ms. Baudilio Woods is expected to optimize her lower extremity strength and ROM in preparation for joint replacement surgery. Reason for Services/Other Comments:  · Achieve baseline assesment of musculoskeletal system, functional mobility and home environment. , educate in PT HEP in preparation for surgery, educate in hospital plan of care. Use of outcome tool(s) and clinical judgement create a POC that gives a: Clear prediction of patient's progress: LOW COMPLEXITY   TREATMENT:   Treatment/Session Assessment:  Patient was instructed in PT- HEP to increase strength and ROM in LEs. Answered all questions. · Post session pain:  5  · Compliance with Program/Exercises: compliant most of the time.   Total Treatment Duration:  PT Patient Time In/Time Out  Time In: 1215  Time Out: [address], PT

## 2018-06-26 NOTE — PROGRESS NOTES
18 1200   Oxygen Therapy   O2 Sat (%) 96 %   Pulse via Oximetry 55 beats per minute   O2 Device Room air   Pre-Treatment   Cough Non-productive   Breath Sounds Bilateral Diminished   Pre FEV1 (liters) 0.5 liters   % Predicted 31  (before 2 puffs albuterol MDI)   Post-Treatment   Post FEV1 (liters) 0.7 liters   % Predicted 39  (after 2 puffs albuterol MDI)   Incentive Spirometry Treatment   Actual Volume (ml) 1000 ml     Initial respiratory Assessment completed with pt. Pt was interviewed and evaluated in Joint [facility] prior to surgery. Patient ID:  Wiliam Knapp  [id number]  23 y.o.  1925  Surgeon: Dr. Claudeen Stabile  Date of Surgery: 2018  Procedure: Total Right Knee Arthroplasty  Primary Care Physician: Mimi Rick [phone]  Specialists:                                  Pt instructed in the use of Incentive Spirometry. Pt instructed to bring Incentive Spirometer back on date of surgery & to start using Is upon return to pt room. Pt taught proper cough technique    History of smokin ppd for 45 years                                                    Quit date:  FORMER      2002    Secondhand smoke: FATHER      Past procedures with Oxygen desaturation:NONE    Past Medical History:   Diagnosis Date    Breast CA ([address]     rt- lumpectomy, radiation    Cardiac arrhythmia 2013    a-fib    Chronic obstructive pulmonary disease (HCC)     GERD (gastroesophageal reflux disease)     Hypertension     Migraine     Oxygen dependent     mainly at night    Thyroid disease                                                                    COPD, ASTHMA                                                                                    Respiratory history:                                 SOB  ON EXERTION                                    Respiratory meds:  HAS DUONEB NEBS AT HOME BUT HAS NOT BEEN USING    PT HASN'T BEEN USING ANY RESPIRATORY MEDICATIONS.   PT INSTRUCTED TO USE NEBULIZER 4 TIMES PER DAY AND USE IS EVERY 2 HOURS WA                                   FAMILY PRESENT:            DAUGHTER- JENNIE SPIVEY                                              GRAND DAUGHTER MIRIAM LOZANO                                                                                          PAST SLEEP STUDY:                  NO  HX OF WADE:                                    NO                                   PT HAD OVERNIGHT OXYMETRY AND USES O2 HS AT 2 LPM  WADE assessment:                                                                      HX OF FATIGUE AND INSOMNIA                         PHYSICAL EXAM   There is no height or weight on file to calculate BMI. Visit Vitals    SpO2 (P) 96%     Neck circumference:   44.5   cm    Loud snoring:                                    DENIES    Witnessed apnea or wakening gasping or choking:,             DENIES,                                                                                                Awakens with headaches:                                                  DENIES    Morning or daytime tiredness/ sleepiness:                                                                                                           TIRED   Dry mouth or sore throat in morning:                YES                                                                            Napoles stage:  3    SACS score:15    STOP/BAN                              CPAP:                       NONE                                        ALBUTEROL NEB Q6            SPACER         CONT SAT HS      O2 HS AT 2 LPM AT HOME      Referrals:  [facility]    Pt.  Phone Number:  CONTACT Andrew Salvador FOR APPOINTMENT [phone]    DAUGHTER IS HAVING SURGERY July 3, 2018- GRANDDAUGHTER CAN BRING PT IF AFTER July 3, 2018

## 2018-06-26 NOTE — PERIOP NOTES
Cinthya Rushing MD    Your patient recently had labs drawn during a hospital appointment due to an upcoming surgery. The results are attached. If you have any questions or concerns please reach out to your patient for a follow-up in your office. Please do not respond to this message as my mailbox is not monitored. You may call [phone] with questions or concerns. Urine culture ordered. Recent Results (from the past 12 hour(s))   CBC WITH AUTOMATED DIFF    Collection Time: 06/26/18 12:05 PM   Result Value Ref Range    WBC 7.9 4.3 - 11.1 K/uL    RBC 4.47 4.05 - 5.25 M/uL    HGB 12.9 11.7 - 15.4 g/dL    HCT 39.9 35.8 - 46.3 %    MCV 89.3 79.6 - 97.8 FL    MCH 28.9 26.1 - 32.9 PG    MCHC 32.3 31.4 - 35.0 g/dL    RDW 14.7 (H) 11.9 - 14.6 %    PLATELET 159 101 - 095 K/uL    MPV 11.7 10.8 - 14.1 FL    DF AUTOMATED      NEUTROPHILS 65 43 - 78 %    LYMPHOCYTES 26 13 - 44 %    MONOCYTES 7 4.0 - 12.0 %    EOSINOPHILS 2 0.5 - 7.8 %    BASOPHILS 0 0.0 - 2.0 %    IMMATURE GRANULOCYTES 0 0.0 - 5.0 %    ABS. NEUTROPHILS 5.1 1.7 - 8.2 K/UL    ABS. LYMPHOCYTES 2.0 0.5 - 4.6 K/UL    ABS. MONOCYTES 0.5 0.1 - 1.3 K/UL    ABS. EOSINOPHILS 0.2 0.0 - 0.8 K/UL    ABS. BASOPHILS 0.0 0.0 - 0.2 K/UL    ABS. IMM.  GRANS. 0.0 0.0 - 0.5 K/UL   PROTHROMBIN TIME + INR    Collection Time: 06/26/18 12:05 PM   Result Value Ref Range    Prothrombin time 13.6 11.5 - 14.5 sec    INR 1.0     PTT    Collection Time: 06/26/18 12:05 PM   Result Value Ref Range    aPTT 31.9 23.2 - 47.6 SEC   METABOLIC PANEL, BASIC    Collection Time: 06/26/18 12:05 PM   Result Value Ref Range    Sodium 139 136 - 145 mmol/L    Potassium 4.6 3.5 - 5.1 mmol/L    Chloride 102 98 - 107 mmol/L    CO2 28 21 - 32 mmol/L    Anion gap 9 7 - 16 mmol/L    Glucose 93 65 - 100 mg/dL    BUN 17 8 - 23 MG/DL    Creatinine 1.61 (H) 0.6 - 1.0 MG/DL    GFR est AA 38 (L) >60 ml/min/1.73m2    GFR est non-AA 32 (L) >60 ml/min/1.73m2    Calcium 9.3 8.3 - 10.4 MG/DL   URINALYSIS W/ RFLX MICROSCOPIC    Collection Time: 06/26/18 12:05 PM   Result Value Ref Range    Color YELLOW      Appearance CLEAR      Specific gravity 1.011 1.001 - 1.023      pH (UA) 5.5 5.0 - 9.0      Protein NEGATIVE  NEG mg/dL    Glucose NEGATIVE  mg/dL    Ketone NEGATIVE  NEG mg/dL    Bilirubin NEGATIVE  NEG      Blood NEGATIVE  NEG      Urobilinogen 0.2 0.2 - 1.0 EU/dL    Nitrites NEGATIVE  NEG      Leukocyte Esterase SMALL (A) NEG      WBC 10-20 0 /hpf    RBC 0-3 0 /hpf    Epithelial cells 3-5 0 /hpf    Bacteria 4+ (H) 0 /hpf    Casts 0-3 0 /lpf   ALBUMIN    Collection Time: 06/26/18 12:05 PM   Result Value Ref Range    Albumin 3.3 3.2 - 4.6 g/dL   HEMOGLOBIN A1C WITH EAG    Collection Time: 06/26/18 12:05 PM   Result Value Ref Range    Hemoglobin A1c 5.5 4.8 - 6.0 %    Est. average glucose 111 mg/dL

## 2018-06-26 NOTE — PERIOP NOTES
Dr. Alie Batista,     Your patient was seen in [place] [address] today. I am attaching the results of the labs for your to review and follow-up with if necessary. If you would like to order additional labs or tests please enter into [facility] or fax to [phone]. Please do not respond to this message as my mailbox is not monitored. You may call [phone] with questions or concerns. Urine culture ordered. Recent Results (from the past 12 hour(s))   CBC WITH AUTOMATED DIFF    Collection Time: 06/26/18 12:05 PM   Result Value Ref Range    WBC 7.9 4.3 - 11.1 K/uL    RBC 4.47 4.05 - 5.25 M/uL    HGB 12.9 11.7 - 15.4 g/dL    HCT 39.9 35.8 - 46.3 %    MCV 89.3 79.6 - 97.8 FL    MCH 28.9 26.1 - 32.9 PG    MCHC 32.3 31.4 - 35.0 g/dL    RDW 14.7 (H) 11.9 - 14.6 %    PLATELET 401 460 - 702 K/uL    MPV 11.7 10.8 - 14.1 FL    DF AUTOMATED      NEUTROPHILS 65 43 - 78 %    LYMPHOCYTES 26 13 - 44 %    MONOCYTES 7 4.0 - 12.0 %    EOSINOPHILS 2 0.5 - 7.8 %    BASOPHILS 0 0.0 - 2.0 %    IMMATURE GRANULOCYTES 0 0.0 - 5.0 %    ABS. NEUTROPHILS 5.1 1.7 - 8.2 K/UL    ABS. LYMPHOCYTES 2.0 0.5 - 4.6 K/UL    ABS. MONOCYTES 0.5 0.1 - 1.3 K/UL    ABS. EOSINOPHILS 0.2 0.0 - 0.8 K/UL    ABS. BASOPHILS 0.0 0.0 - 0.2 K/UL    ABS. IMM.  GRANS. 0.0 0.0 - 0.5 K/UL   PROTHROMBIN TIME + INR    Collection Time: 06/26/18 12:05 PM   Result Value Ref Range    Prothrombin time 13.6 11.5 - 14.5 sec    INR 1.0     PTT    Collection Time: 06/26/18 12:05 PM   Result Value Ref Range    aPTT 31.9 23.2 - 48.9 SEC   METABOLIC PANEL, BASIC    Collection Time: 06/26/18 12:05 PM   Result Value Ref Range    Sodium 139 136 - 145 mmol/L    Potassium 4.6 3.5 - 5.1 mmol/L    Chloride 102 98 - 107 mmol/L    CO2 28 21 - 32 mmol/L    Anion gap 9 7 - 16 mmol/L    Glucose 93 65 - 100 mg/dL    BUN 17 8 - 23 MG/DL    Creatinine 1.61 (H) 0.6 - 1.0 MG/DL    GFR est AA 38 (L) >60 ml/min/1.73m2    GFR est non-AA 32 (L) >60 ml/min/1.73m2    Calcium 9.3 8.3 - 10.4 MG/DL URINALYSIS W/ RFLX MICROSCOPIC    Collection Time: 06/26/18 12:05 PM   Result Value Ref Range    Color YELLOW      Appearance CLEAR      Specific gravity 1.011 1.001 - 1.023      pH (UA) 5.5 5.0 - 9.0      Protein NEGATIVE  NEG mg/dL    Glucose NEGATIVE  mg/dL    Ketone NEGATIVE  NEG mg/dL    Bilirubin NEGATIVE  NEG      Blood NEGATIVE  NEG      Urobilinogen 0.2 0.2 - 1.0 EU/dL    Nitrites NEGATIVE  NEG      Leukocyte Esterase SMALL (A) NEG      WBC 10-20 0 /hpf    RBC 0-3 0 /hpf    Epithelial cells 3-5 0 /hpf    Bacteria 4+ (H) 0 /hpf    Casts 0-3 0 /lpf   ALBUMIN    Collection Time: 06/26/18 12:05 PM   Result Value Ref Range    Albumin 3.3 3.2 - 4.6 g/dL   HEMOGLOBIN A1C WITH EAG    Collection Time: 06/26/18 12:05 PM   Result Value Ref Range    Hemoglobin A1c 5.5 4.8 - 6.0 %    Est. average glucose 111 mg/dL

## 2018-06-26 NOTE — PERIOP NOTES
Patient verified name, , and surgery as listed in [facility]. Type 3 surgery, walk in assessment complete. Labs per surgeon: CBC, BMP, U/A, PT/PTT, Albumin, HGB-A1C ; results within [facility] protocols except Creatinine=1.61; MDA to review. MRSA nasal swab and Urine Nicotine pending. Labs per anesthesia protocol: included in surgeons orders. EKG: done today; within [facility] protocols. Mateo Ahmadi NP called and came to see patient due to elevated creatinine and 4+ bacteria in urine. Received order for Urine Culture and patient was instructed to increase water intake. Dr. Rene Garrison with anesthesia called due to heart murmur with mild aortic valve stenosis per echo in 2016. Requests that patient has an echo prior to surgery. Scheduling called at [phone]. [phone] to schedule echo. States no availability until 7/10/18 which is after patient's surgery. [facility] at Dr. Thiago Evans office called and left voicemail that patient needs an echo prior to surgery per anesthesia and that patient is already followed by [facility]. [facility] cardiology office notes available in care everywhere if needed. Per telephone encounter between Dr. Thiago Evans office and [facility] cardiology (Dr. Nimco Mtz) dated 18 states: \"Pt has no history of systolic heart failure, ischemic heart disease, nor significant valvular abnormalities. Her most physically restricting comorbidity is COPD. If she has no new cardiovascular complaints since January (when I saw her last), she does not need further cardiac testing prior to upcoming surgery. Thanks\". Hibiclens and instructions to return bottle on DOS given per hospital policy. Nasal Swab collected per MD order and instructions for Mupirocin nasal ointment if required. Patient provided with handouts including Guide to Surgery, Pain Management, Hand Hygiene, Blood Transfusion Education, and [facility] Brochure.     Patient answered medical/surgical history questions at their best of ability. All prior to admission medications documented in [facility]. Original medication prescription bottles not visualized during patient appointment. Patient instructed to hold all vitamins 7 days prior to surgery and NSAIDS 5 days prior to surgery. Medications to be held: vitamins. Patient instructed to continue previous medications as prescribed prior to surgery and to take the following medications the day of surgery according to anesthesia guidelines with a small sip of water: albuterol inhaler or neb if needed, 81 mg aspirin, levothyroxine, metoprolol, omeprazole, theophylline. Instructed to bring omeprazole and inhaler dos. Patient teach back successful and patient demonstrates knowledge of instruction.

## 2018-06-27 ENCOUNTER — ANESTHESIA EVENT (OUTPATIENT)
Dept: SURGERY | Age: 83
DRG: 470 | End: 2018-06-27
Payer: MEDICARE

## 2018-06-27 LAB
COTININE UR QL SCN: NEGATIVE NG/ML
DRUG SCREEN COMMENT:, 753798: NORMAL

## 2018-06-27 NOTE — PERIOP NOTES
Dr So Warren (anesthesia) reviewed pt's creatinine result with pt's labs in EMR and Care Everywhere and stated okay for surgery- no further action required re creatinine. Chencho Little (MA to Dr Jeramy Saini) returned call and stated that she has contacted [facility] re need for ECHO prior to surgery- waiting to hear back re appt. Found ECHO appt scheduled for tomorrow 6/28/18 at 1430 in [facility]. Will follow up with results.

## 2018-06-27 NOTE — PERIOP NOTES
Nasal swab reviewed. 6/26/2018  9:56 PM - Shaq, Lab In [employer]       Component Results      Component Value Flag Ref Range Units Status     Special Requests: NO SPECIAL REQUESTS     Final     Culture result:  (A)    Final     MRSA target DNA detected, SA target DNA detected.       A positive test result does not necessarily indicate the presence of viable organisms. It is however, presumptive for the presence of MRSA or SA. Pt called (Mikal Huntley- #[phone]) and notified of result. Mupirocin ointment 2% 22 gram tube called into pt's pharmacy ([facility] #[phone]) and instructions left for pt to apply intra-nasally to both nostrils BID x 5 days for a total of 10 doses prior to surgery per protocol.

## 2018-06-28 ENCOUNTER — HOSPITAL ENCOUNTER (OUTPATIENT)
Dept: GENERAL RADIOLOGY | Age: 83
Discharge: HOME | End: 2018-06-28
Payer: MEDICARE

## 2018-06-28 DIAGNOSIS — J44.9 CHRONIC OBSTRUCTIVE PULMONARY DISEASE, UNSPECIFIED COPD TYPE (HCC): ICD-10-CM

## 2018-06-28 PROCEDURE — 71046 X-RAY EXAM CHEST 2 VIEWS: CPT

## 2018-06-28 NOTE — PERIOP NOTES
Nicotine level - neg    6/27/2018  7:35 PM - Shaq, Lab In [facility]   Component Results   Component Value Flag Ref Range Units Status   Cotinine Screen, urine NEGATIVE

## 2018-06-29 LAB
BACTERIA SPEC CULT: ABNORMAL
SERVICE CMNT-IMP: ABNORMAL

## 2018-06-29 NOTE — PERIOP NOTES
Pt with Echo performed at [facility] 6/28/18. Results in EMR:    LVSF normal (55-60%)  Grade II Left ventricular diastolic dysfunction  Moderate Pulmonary Hypertension  Mild aortic valve stenosis  Mild to moderate aortic valve regurgitation. Copy of Echo placed on chart for anesthesia to review.

## 2018-07-02 PROBLEM — Z78.9 BASELINE FORCED EXPIRATORY VOLUME AT END OF ONE SECOND (FEV1) 30% TO 80% PREDICTED: Status: ACTIVE | Noted: 2018-07-02

## 2018-07-02 PROBLEM — R82.90 ABNORMAL URINALYSIS: Status: ACTIVE | Noted: 2018-07-02

## 2018-07-02 NOTE — ADVANCED PRACTICE NURSE
Total Joint Surgery Preoperative Chart Review      Patient ID:  Leeann Favre  [id number]  72 y.o.  6/28/1925  Surgeon: Dr. Blu Porras  Date of Surgery: 7/9/2018  Procedure: Total Right Knee Arthroplasty  Primary Care Physician: Maria M Das [phone]  Specialty Physician(s):      Subjective:   Leeann Favre is a 80 y.o. WHITE OR  female who presents for preoperative evaluation for Total Right Knee arthroplasty. This is a preoperative chart review note based on data collected by the nurse at the surgical Pre-Assessment visit. Past Medical History:   Diagnosis Date    Arthritis     Asthma     mild    Breast CA (HCC)     rt- lumpectomy, radiation    Cardiac arrhythmia 03/2013    a-fib    Chronic obstructive pulmonary disease (HCC)     prn inhaler; on 2L/NC at bedtime     Chronic pain     right knee    Former smoker     GERD (gastroesophageal reflux disease)     controlled with med     Heart failure ([address]     Heart murmur     last echo 10/14/16    High cholesterol     Hypertension     on med for control     Incontinence in female     Migraine     Mild aortic stenosis     Oxygen dependent     mainly at night 2L/NC    Thyroid disease     hypo      Past Surgical History:   Procedure Laterality Date    HX APPENDECTOMY      HX BREAST LUMPECTOMY Right 10/31/2012    HX CHOLECYSTECTOMY      HX HYSTERECTOMY      HX KNEE ARTHROSCOPY Right     HX OPEN REDUCTION INTERNAL FIXATION Right 05/31/2018    RIGHT DISTAL RADIUS     HX OTHER SURGICAL      colpocleisis     HX SPLENECTOMY  1998     Family History   Problem Relation Age of Onset    Cancer Mother       Social History   Substance Use Topics    Smoking status: Former Smoker     Packs/day: 1.00     Years: 45.00     Types: Cigarettes     Quit date: 8/21/2002    Smokeless tobacco: Never Used    Alcohol use No       Prior to Admission medications    Medication Sig Start Date End Date Taking?  Authorizing Provider   mupirocin calcium (BACTROBAN) 2 % nasal ointment by Both Nostrils route two (2) times a day. Yes Historical Provider   aspirin delayed-release 81 mg tablet Take 81 mg by mouth every other day. Take / use AM day of surgery  per anesthesia protocols if due   Yes Historical Provider   ALBUTEROL IN Take  by inhalation daily as needed. Take / use AM day of surgery  per anesthesia protocols if needed. Yes Historical Provider   theophylline ER,12 hour, (THEOCHRON) 300 mg tablet Take 300 mg by mouth two (2) times a day. Take / use AM day of surgery  per anesthesia protocols. Yes Historical Provider   montelukast (SINGULAIR) 10 mg tablet Take 1 Tab by mouth nightly. 12/7/17  Yes Kam Joshi MD   atorvastatin (LIPITOR) 20 mg tablet Take 1 Tab by mouth daily. Patient taking differently: Take 20 mg by mouth nightly. 12/7/17  Yes Kam Joshi MD   LORazepam (ATIVAN) 0.5 mg tablet Take 1 Tab by mouth nightly. Max Daily Amount: 0.5 mg. Patient taking differently: Take 0.5 mg by mouth daily (after dinner). 12/7/17  Yes Kam Joshi MD   omeprazole (PRILOSEC) 20 mg capsule Take 1 Cap by mouth daily. 12/7/17  Yes Kam Joshi MD   levothyroxine (SYNTHROID) 50 mcg tablet Take 1 Tab by mouth Daily (before breakfast). 12/7/17  Yes Kam Joshi MD   lisinopril (PRINIVIL, ZESTRIL) 10 mg tablet Take 1 Tab by mouth daily. 12/7/17  Yes Kam Joshi MD   furosemide (LASIX) 20 mg tablet Take 1 tablet daily, alternate with 1/2 tablet daily. Patient taking differently: 20 mg daily. Take 1 tablet daily, alternate with 1/2 tablet daily. 12/7/17  Yes Kam Joshi MD   metoprolol tartrate (LOPRESSOR) 25 mg tablet Take 1 Tab by mouth two (2) times a day. 11/10/17  Yes Kam Joshi MD   magnesium 250 mg tab Take 250 mg by mouth daily. Yes Historical Provider   denosumab (PROLIA) 60 mg/mL injection 1 mL by SubCUTAneous route once for 1 dose.  Every 6 months, last shot 6/9/16        May draw labs if needed     Original order is good for 6 mths  Indications: POST-MENOPAUSAL OSTEOPOROSIS  Patient taking differently: 60 mg by SubCUTAneous route once. Every 6 months, last shot 04/2018      May draw labs if needed     Original order is good for 6 mths  Indications: POST-MENOPAUSAL OSTEOPOROSIS 6/8/17 6/26/18 Yes Jany Head MD   calcium carbonate (OS-EMERITA) 500 mg calcium (1,250 mg) tablet Take 1 Tab by mouth two (2) times a day. Yes Historical Provider   VITAMIN E, DL,TOCOPHERYL ACET, (VITAMIN E ACETATE) 400 unit cap capsule Take 400 Units by mouth daily. Yes Historical Provider   Cholecalciferol, Vitamin D3, (VITAMIN D3) 1,000 unit cap Take 1 Tab by mouth daily. Yes Historical Provider   albuterol (VENTOLIN HFA) 90 mcg/actuation inhaler Take 2 Puffs by inhalation every four (4) hours as needed for Wheezing or Shortness of Breath. Take / use AM day of surgery  per anesthesia protocols if needed. Indications: Acute Asthma Attack, Chronic Obstructive Pulmonary Disease   Yes Historical Provider     Allergies   Allergen Reactions    Codeine Itching    Phenobarbital Unknown (comments)    Tramadol Itching          Objective:     Physical Exam:       ECG:    EKG Results     Procedure 720 Value Units Date/Time    EKG, 12 LEAD, INITIAL [[id number]] Collected:  06/26/18 1401    Order Status:  Completed Updated:  06/26/18 1725     Ventricular Rate 56 BPM      Atrial Rate 56 BPM      P-R Interval 172 ms      QRS Duration 102 ms      Q-T Interval 424 ms      QTC Calculation (Bezet) 409 ms      Calculated P Axis 70 degrees      Calculated R Axis -34 degrees      Calculated T Axis 0 degrees      Diagnosis --     Sinus bradycardia  Left axis deviation  Minimal voltage criteria for LVH, may be normal variant  Abnormal ECG  No previous ECGs available  Confirmed by Maribell San MD (), [facility] ([id number]) on 6/26/2018 5:25:52 PM            Data Review:   Labs:     Results for Kayode Head (MRN [id number]) as of 7/2/2018 14:12   Ref.  Range 6/26/2018 12:05   Sodium Latest Ref Range: 136 - 145 mmol/L 139   Potassium Latest Ref Range: 3.5 - 5.1 mmol/L 4.6   Chloride Latest Ref Range: 98 - 107 mmol/L 102   CO2 Latest Ref Range: 21 - 32 mmol/L 28   Anion gap Latest Ref Range: 7 - 16 mmol/L 9   Glucose Latest Ref Range: 65 - 100 mg/dL 93   BUN Latest Ref Range: 8 - 23 MG/DL 17   Creatinine Latest Ref Range: 0.6 - 1.0 MG/DL 1.61 (H)   Calcium Latest Ref Range: 8.3 - 10.4 MG/DL 9.3   GFR est non-AA Latest Ref Range: >60 ml/min/1.73m2 32 (L)   GFR est AA Latest Ref Range: >60 ml/min/1.73m2 38 (L)   Albumin Latest Ref Range: 3.2 - 4.6 g/dL 3.3   Hemoglobin A1c, (calculated) Latest Ref Range: 4.8 - 6.0 % 5.5   Est. average glucose Latest Units: mg/dL 111       Problem List:  )  Patient Active Problem List   Diagnosis Code    Simple chronic bronchitis (HCC) J41.0    HTN (hypertension) I10    Pure hypercholesterolemia E78.00    History of breast cancer Z85.3    Osteoporosis M81.0    Memory loss R41.3    Hallucinations, visual R44.1    Osteoarthritis of both knees M17.0    Chronic kidney disease, stage III (moderate) N18.3    H/O splenectomy Z90.81    Chronic pain syndrome G89.4    Congestive heart failure (HCC) I50.9    Nonsustained ventricular tachycardia (HCC) I47.2       Total Joint Surgery Pre-Assessment Recommendations:        Significant COPD issues and has not been compliant with respiratory medications. PT INSTRUCTED TO USE NEBULIZER 4 TIMES PER DAY. Will refer to pulmonary for evaluation of COPD and Asthma. FEV1 39%. Patient requires O2  Qhs.    Recommend continuous saturation monitoring during hospitalization. PEP Therapy TID  Albuterol every 4 hours WA  during hospitalization. Abnormal urinalysis. Culture pending. Will call in antibiotic if indicated.        Signed By: JUNIOR Euceda    July 2, 2018

## 2018-07-02 NOTE — ADVANCED PRACTICE NURSE
Urine Microbiology results reviewed. Antibiotic therapy is indicated this time. Called and spoke with the patients daughter \"Daylin\" about the results. Called in  Macrobid 100 mg po bid #14 with zero refills  prescription to [facility] at [phone]. Repeat urinalysis on day of surgery.

## 2018-07-06 RX ORDER — SODIUM CHLORIDE 0.9 % (FLUSH) 0.9 %
5-10 SYRINGE (ML) INJECTION EVERY 8 HOURS
Status: CANCELLED | OUTPATIENT
Start: 2018-07-06

## 2018-07-09 ENCOUNTER — ANESTHESIA (OUTPATIENT)
Dept: SURGERY | Age: 83
DRG: 470 | End: 2018-07-09
Payer: MEDICARE

## 2018-07-09 ENCOUNTER — HOSPITAL ENCOUNTER (INPATIENT)
Age: 83
LOS: 3 days | Discharge: NURSING, REHAB OR LONG-TERM CARE | DRG: 470 | End: 2018-07-12
Attending: ORTHOPAEDIC SURGERY | Admitting: ORTHOPAEDIC SURGERY
Payer: MEDICARE

## 2018-07-09 DIAGNOSIS — I10 ESSENTIAL HYPERTENSION: ICD-10-CM

## 2018-07-09 DIAGNOSIS — M17.11 PRIMARY OSTEOARTHRITIS OF RIGHT KNEE: Primary | ICD-10-CM

## 2018-07-09 DIAGNOSIS — G89.4 CHRONIC PAIN SYNDROME: ICD-10-CM

## 2018-07-09 DIAGNOSIS — N18.30 CHRONIC KIDNEY DISEASE, STAGE III (MODERATE) (HCC): ICD-10-CM

## 2018-07-09 PROBLEM — M17.9 OA (OSTEOARTHRITIS) OF KNEE: Status: ACTIVE | Noted: 2018-07-09

## 2018-07-09 LAB
ABO + RH BLD: NORMAL
APPEARANCE UR: CLEAR
BILIRUB UR QL: NEGATIVE
BLOOD GROUP ANTIBODIES SERPL: NORMAL
COLOR UR: YELLOW
GLUCOSE BLD STRIP.AUTO-MCNC: 83 MG/DL (ref 65–100)
GLUCOSE UR STRIP.AUTO-MCNC: NEGATIVE MG/DL
HGB BLD-MCNC: 11.2 G/DL (ref 11.7–15.4)
HGB UR QL STRIP: NEGATIVE
KETONES UR QL STRIP.AUTO: ABNORMAL MG/DL
LEUKOCYTE ESTERASE UR QL STRIP.AUTO: NEGATIVE
NITRITE UR QL STRIP.AUTO: NEGATIVE
PH UR STRIP: 7.5 [PH] (ref 5–9)
PROT UR STRIP-MCNC: NEGATIVE MG/DL
SP GR UR REFRACTOMETRY: 1.01 (ref 1–1.02)
SPECIMEN EXP DATE BLD: NORMAL
UROBILINOGEN UR QL STRIP.AUTO: 0.2 EU/DL (ref 0.2–1)

## 2018-07-09 PROCEDURE — 85018 HEMOGLOBIN: CPT | Performed by: ORTHOPAEDIC SURGERY

## 2018-07-09 PROCEDURE — 74011250637 HC RX REV CODE- 250/637: Performed by: ORTHOPAEDIC SURGERY

## 2018-07-09 PROCEDURE — 76010010054 HC POST OP PAIN BLOCK: Performed by: ORTHOPAEDIC SURGERY

## 2018-07-09 PROCEDURE — 94762 N-INVAS EAR/PLS OXIMTRY CONT: CPT

## 2018-07-09 PROCEDURE — 94640 AIRWAY INHALATION TREATMENT: CPT

## 2018-07-09 PROCEDURE — 77030007880 HC KT SPN EPDRL BBMI -B: Performed by: ANESTHESIOLOGY

## 2018-07-09 PROCEDURE — 74011250636 HC RX REV CODE- 250/636: Performed by: ORTHOPAEDIC SURGERY

## 2018-07-09 PROCEDURE — 76210000016 HC OR PH I REC 1 TO 1.5 HR: Performed by: ORTHOPAEDIC SURGERY

## 2018-07-09 PROCEDURE — 77030013727 HC IRR FAN PULSVC ZIMM -B: Performed by: ORTHOPAEDIC SURGERY

## 2018-07-09 PROCEDURE — 77030002922 HC SUT FBRWRE ARTH -B: Performed by: ORTHOPAEDIC SURGERY

## 2018-07-09 PROCEDURE — 74011000258 HC RX REV CODE- 258: Performed by: ORTHOPAEDIC SURGERY

## 2018-07-09 PROCEDURE — 77030033067 HC SUT PDO STRATFX SPIR J&J -B: Performed by: ORTHOPAEDIC SURGERY

## 2018-07-09 PROCEDURE — 86900 BLOOD TYPING SEROLOGIC ABO: CPT | Performed by: ORTHOPAEDIC SURGERY

## 2018-07-09 PROCEDURE — 74011250636 HC RX REV CODE- 250/636

## 2018-07-09 PROCEDURE — 77030003666 HC NDL SPINAL BD -A: Performed by: ORTHOPAEDIC SURGERY

## 2018-07-09 PROCEDURE — 87086 URINE CULTURE/COLONY COUNT: CPT | Performed by: ORTHOPAEDIC SURGERY

## 2018-07-09 PROCEDURE — 81003 URINALYSIS AUTO W/O SCOPE: CPT | Performed by: ORTHOPAEDIC SURGERY

## 2018-07-09 PROCEDURE — 65270000029 HC RM PRIVATE

## 2018-07-09 PROCEDURE — 86580 TB INTRADERMAL TEST: CPT | Performed by: ORTHOPAEDIC SURGERY

## 2018-07-09 PROCEDURE — 94760 N-INVAS EAR/PLS OXIMETRY 1: CPT

## 2018-07-09 PROCEDURE — 77030011640 HC PAD GRND REM COVD -A: Performed by: ORTHOPAEDIC SURGERY

## 2018-07-09 PROCEDURE — 77030027520 HC SEAL BPLR AQMNTYS MEDT -F: Performed by: ORTHOPAEDIC SURGERY

## 2018-07-09 PROCEDURE — 74011000302 HC RX REV CODE- 302: Performed by: ORTHOPAEDIC SURGERY

## 2018-07-09 PROCEDURE — 74011000250 HC RX REV CODE- 250: Performed by: ORTHOPAEDIC SURGERY

## 2018-07-09 PROCEDURE — C1776 JOINT DEVICE (IMPLANTABLE): HCPCS | Performed by: ORTHOPAEDIC SURGERY

## 2018-07-09 PROCEDURE — 76060000035 HC ANESTHESIA 2 TO 2.5 HR: Performed by: ORTHOPAEDIC SURGERY

## 2018-07-09 PROCEDURE — 77030018836 HC SOL IRR NACL ICUM -A: Performed by: ORTHOPAEDIC SURGERY

## 2018-07-09 PROCEDURE — 76010000171 HC OR TIME 2 TO 2.5 HR INTENSV-TIER 1: Performed by: ORTHOPAEDIC SURGERY

## 2018-07-09 PROCEDURE — 77030002933 HC SUT MCRYL J&J -A: Performed by: ORTHOPAEDIC SURGERY

## 2018-07-09 PROCEDURE — 77030003602 HC NDL NRV BLK BBMI -B: Performed by: ANESTHESIOLOGY

## 2018-07-09 PROCEDURE — 77030034849: Performed by: ORTHOPAEDIC SURGERY

## 2018-07-09 PROCEDURE — 77030020263 HC SOL INJ SOD CL0.9% LFCR 1000ML

## 2018-07-09 PROCEDURE — 97165 OT EVAL LOW COMPLEX 30 MIN: CPT

## 2018-07-09 PROCEDURE — 0SRC0J9 REPLACEMENT OF RIGHT KNEE JOINT WITH SYNTHETIC SUBSTITUTE, CEMENTED, OPEN APPROACH: ICD-10-PCS | Performed by: ORTHOPAEDIC SURGERY

## 2018-07-09 PROCEDURE — 77030036688 HC BLNKT CLD THER S2SG -B

## 2018-07-09 PROCEDURE — 74011250636 HC RX REV CODE- 250/636: Performed by: ANESTHESIOLOGY

## 2018-07-09 PROCEDURE — 99221 1ST HOSP IP/OBS SF/LOW 40: CPT | Performed by: PHYSICAL MEDICINE & REHABILITATION

## 2018-07-09 PROCEDURE — 74011000250 HC RX REV CODE- 250

## 2018-07-09 PROCEDURE — C1713 ANCHOR/SCREW BN/BN,TIS/BN: HCPCS | Performed by: ORTHOPAEDIC SURGERY

## 2018-07-09 PROCEDURE — 76942 ECHO GUIDE FOR BIOPSY: CPT | Performed by: ORTHOPAEDIC SURGERY

## 2018-07-09 PROCEDURE — 77030020782 HC GWN BAIR PAWS FLX 3M -B: Performed by: ANESTHESIOLOGY

## 2018-07-09 PROCEDURE — 77030003665 HC NDL SPN BBMI -A: Performed by: ANESTHESIOLOGY

## 2018-07-09 PROCEDURE — 77030012935 HC DRSG AQUACEL BMS -B: Performed by: ORTHOPAEDIC SURGERY

## 2018-07-09 PROCEDURE — 36416 COLLJ CAPILLARY BLOOD SPEC: CPT | Performed by: ORTHOPAEDIC SURGERY

## 2018-07-09 PROCEDURE — 97110 THERAPEUTIC EXERCISES: CPT

## 2018-07-09 PROCEDURE — 82962 GLUCOSE BLOOD TEST: CPT

## 2018-07-09 PROCEDURE — 97163 PT EVAL HIGH COMPLEX 45 MIN: CPT

## 2018-07-09 DEVICE — FEM KNE CEM CR SZ 4 RT -- TRIATHLON: Type: IMPLANTABLE DEVICE | Site: KNEE | Status: FUNCTIONAL

## 2018-07-09 DEVICE — (D)CEMENT BNE HV R 40GM -- DUPE USE ITEM 353850: Type: IMPLANTABLE DEVICE | Site: KNEE | Status: FUNCTIONAL

## 2018-07-09 DEVICE — COMPONENT PAT DIA32MM THK10MM SUP INFERIOR ASYM TRIATHLON: Type: IMPLANTABLE DEVICE | Site: KNEE | Status: FUNCTIONAL

## 2018-07-09 DEVICE — TIBIAL BEARING INSERT
Type: IMPLANTABLE DEVICE | Site: KNEE | Status: FUNCTIONAL
Brand: TRIATHLON

## 2018-07-09 DEVICE — BASEPLT TIB UNIV TRIATHLN 4 --: Type: IMPLANTABLE DEVICE | Site: KNEE | Status: FUNCTIONAL

## 2018-07-09 RX ORDER — SODIUM CHLORIDE, SODIUM LACTATE, POTASSIUM CHLORIDE, CALCIUM CHLORIDE 600; 310; 30; 20 MG/100ML; MG/100ML; MG/100ML; MG/100ML
999 INJECTION, SOLUTION INTRAVENOUS ONCE
Status: COMPLETED | OUTPATIENT
Start: 2018-07-09 | End: 2018-07-09

## 2018-07-09 RX ORDER — DIPHENHYDRAMINE HCL 25 MG
25 CAPSULE ORAL
Status: DISCONTINUED | OUTPATIENT
Start: 2018-07-09 | End: 2018-07-12 | Stop reason: HOSPADM

## 2018-07-09 RX ORDER — OXYCODONE AND ACETAMINOPHEN 10; 325 MG/1; MG/1
1 TABLET ORAL AS NEEDED
Status: DISCONTINUED | OUTPATIENT
Start: 2018-07-09 | End: 2018-07-09 | Stop reason: HOSPADM

## 2018-07-09 RX ORDER — HYDROMORPHONE HYDROCHLORIDE 2 MG/ML
0.5 INJECTION, SOLUTION INTRAMUSCULAR; INTRAVENOUS; SUBCUTANEOUS
Status: DISCONTINUED | OUTPATIENT
Start: 2018-07-09 | End: 2018-07-09 | Stop reason: HOSPADM

## 2018-07-09 RX ORDER — AMOXICILLIN 250 MG
2 CAPSULE ORAL DAILY
Qty: 120 TAB | Refills: 0 | Status: SHIPPED | OUTPATIENT
Start: 2018-07-10

## 2018-07-09 RX ORDER — ACETAMINOPHEN 10 MG/ML
1000 INJECTION, SOLUTION INTRAVENOUS ONCE
Status: COMPLETED | OUTPATIENT
Start: 2018-07-09 | End: 2018-07-09

## 2018-07-09 RX ORDER — THEOPHYLLINE 300 MG/1
300 TABLET, EXTENDED RELEASE ORAL 2 TIMES DAILY
Status: DISCONTINUED | OUTPATIENT
Start: 2018-07-09 | End: 2018-07-12 | Stop reason: HOSPADM

## 2018-07-09 RX ORDER — ASPIRIN 81 MG/1
81 TABLET ORAL EVERY 12 HOURS
Qty: 120 TAB | Refills: 0 | Status: SHIPPED | OUTPATIENT
Start: 2018-07-09

## 2018-07-09 RX ORDER — SODIUM CHLORIDE 0.9 % (FLUSH) 0.9 %
5-10 SYRINGE (ML) INJECTION AS NEEDED
Status: DISCONTINUED | OUTPATIENT
Start: 2018-07-09 | End: 2018-07-09 | Stop reason: HOSPADM

## 2018-07-09 RX ORDER — FLUTICASONE FUROATE AND VILANTEROL 200; 25 UG/1; UG/1
1 POWDER RESPIRATORY (INHALATION) DAILY
Status: DISCONTINUED | OUTPATIENT
Start: 2018-07-10 | End: 2018-07-12 | Stop reason: HOSPADM

## 2018-07-09 RX ORDER — LANOLIN ALCOHOL/MO/W.PET/CERES
400 CREAM (GRAM) TOPICAL DAILY
Status: DISCONTINUED | OUTPATIENT
Start: 2018-07-10 | End: 2018-07-12 | Stop reason: HOSPADM

## 2018-07-09 RX ORDER — HYDROCODONE BITARTRATE AND ACETAMINOPHEN 7.5; 325 MG/1; MG/1
1 TABLET ORAL
Qty: 60 TAB | Refills: 0 | Status: SHIPPED | OUTPATIENT
Start: 2018-07-09 | End: 2019-07-11

## 2018-07-09 RX ORDER — PROPOFOL 10 MG/ML
INJECTION, EMULSION INTRAVENOUS
Status: DISCONTINUED | OUTPATIENT
Start: 2018-07-09 | End: 2018-07-09 | Stop reason: HOSPADM

## 2018-07-09 RX ORDER — ONDANSETRON 8 MG/1
8 TABLET, ORALLY DISINTEGRATING ORAL
Status: DISCONTINUED | OUTPATIENT
Start: 2018-07-09 | End: 2018-07-12 | Stop reason: HOSPADM

## 2018-07-09 RX ORDER — ACETAMINOPHEN 500 MG
1000 TABLET ORAL EVERY 6 HOURS
Qty: 120 TAB | Refills: 0 | Status: SHIPPED | OUTPATIENT
Start: 2018-07-10

## 2018-07-09 RX ORDER — HYDROMORPHONE HYDROCHLORIDE 2 MG/ML
1 INJECTION, SOLUTION INTRAMUSCULAR; INTRAVENOUS; SUBCUTANEOUS
Status: DISCONTINUED | OUTPATIENT
Start: 2018-07-09 | End: 2018-07-12 | Stop reason: HOSPADM

## 2018-07-09 RX ORDER — SODIUM CHLORIDE 9 MG/ML
100 INJECTION, SOLUTION INTRAVENOUS CONTINUOUS
Status: DISPENSED | OUTPATIENT
Start: 2018-07-09 | End: 2018-07-11

## 2018-07-09 RX ORDER — TRANEXAMIC ACID 100 MG/ML
INJECTION, SOLUTION INTRAVENOUS AS NEEDED
Status: DISCONTINUED | OUTPATIENT
Start: 2018-07-09 | End: 2018-07-09 | Stop reason: HOSPADM

## 2018-07-09 RX ORDER — ASPIRIN 81 MG/1
81 TABLET ORAL EVERY 12 HOURS
Status: DISCONTINUED | OUTPATIENT
Start: 2018-07-09 | End: 2018-07-12 | Stop reason: HOSPADM

## 2018-07-09 RX ORDER — CEFAZOLIN SODIUM/WATER 2 G/20 ML
2 SYRINGE (ML) INTRAVENOUS EVERY 8 HOURS
Status: COMPLETED | OUTPATIENT
Start: 2018-07-09 | End: 2018-07-10

## 2018-07-09 RX ORDER — OXYCODONE HYDROCHLORIDE 5 MG/1
5 TABLET ORAL
Status: DISCONTINUED | OUTPATIENT
Start: 2018-07-09 | End: 2018-07-09 | Stop reason: HOSPADM

## 2018-07-09 RX ORDER — ALBUTEROL SULFATE 0.83 MG/ML
2.5 SOLUTION RESPIRATORY (INHALATION)
Status: DISCONTINUED | OUTPATIENT
Start: 2018-07-09 | End: 2018-07-12 | Stop reason: HOSPADM

## 2018-07-09 RX ORDER — ONDANSETRON 2 MG/ML
INJECTION INTRAMUSCULAR; INTRAVENOUS AS NEEDED
Status: DISCONTINUED | OUTPATIENT
Start: 2018-07-09 | End: 2018-07-09 | Stop reason: HOSPADM

## 2018-07-09 RX ORDER — MELATONIN
1000 DAILY
Status: DISCONTINUED | OUTPATIENT
Start: 2018-07-10 | End: 2018-07-12 | Stop reason: HOSPADM

## 2018-07-09 RX ORDER — MIDAZOLAM HYDROCHLORIDE 1 MG/ML
2 INJECTION, SOLUTION INTRAMUSCULAR; INTRAVENOUS ONCE
Status: DISCONTINUED | OUTPATIENT
Start: 2018-07-09 | End: 2018-07-09 | Stop reason: HOSPADM

## 2018-07-09 RX ORDER — ATORVASTATIN CALCIUM 10 MG/1
20 TABLET, FILM COATED ORAL
Status: DISCONTINUED | OUTPATIENT
Start: 2018-07-09 | End: 2018-07-12 | Stop reason: HOSPADM

## 2018-07-09 RX ORDER — LEVOTHYROXINE SODIUM 50 UG/1
50 TABLET ORAL
Status: DISCONTINUED | OUTPATIENT
Start: 2018-07-10 | End: 2018-07-12 | Stop reason: HOSPADM

## 2018-07-09 RX ORDER — FENTANYL CITRATE 50 UG/ML
100 INJECTION, SOLUTION INTRAMUSCULAR; INTRAVENOUS ONCE
Status: COMPLETED | OUTPATIENT
Start: 2018-07-09 | End: 2018-07-09

## 2018-07-09 RX ORDER — METOPROLOL TARTRATE 25 MG/1
25 TABLET, FILM COATED ORAL 2 TIMES DAILY
Status: DISCONTINUED | OUTPATIENT
Start: 2018-07-09 | End: 2018-07-12 | Stop reason: HOSPADM

## 2018-07-09 RX ORDER — TRANEXAMIC ACID 650 1/1
1300 TABLET ORAL
Status: DISPENSED | OUTPATIENT
Start: 2018-07-09 | End: 2018-07-09

## 2018-07-09 RX ORDER — FUROSEMIDE 20 MG/1
20 TABLET ORAL DAILY
Status: DISCONTINUED | OUTPATIENT
Start: 2018-07-10 | End: 2018-07-12 | Stop reason: HOSPADM

## 2018-07-09 RX ORDER — ROPIVACAINE HYDROCHLORIDE 2 MG/ML
INJECTION, SOLUTION EPIDURAL; INFILTRATION; PERINEURAL AS NEEDED
Status: DISCONTINUED | OUTPATIENT
Start: 2018-07-09 | End: 2018-07-09 | Stop reason: HOSPADM

## 2018-07-09 RX ORDER — CEFAZOLIN SODIUM/WATER 2 G/20 ML
2 SYRINGE (ML) INTRAVENOUS ONCE
Status: COMPLETED | OUTPATIENT
Start: 2018-07-09 | End: 2018-07-09

## 2018-07-09 RX ORDER — CALCIUM CARBONATE 500(1250)
500 TABLET ORAL 2 TIMES DAILY
Status: DISCONTINUED | OUTPATIENT
Start: 2018-07-09 | End: 2018-07-12 | Stop reason: HOSPADM

## 2018-07-09 RX ORDER — AMOXICILLIN 250 MG
2 CAPSULE ORAL DAILY
Status: DISCONTINUED | OUTPATIENT
Start: 2018-07-10 | End: 2018-07-12 | Stop reason: HOSPADM

## 2018-07-09 RX ORDER — EPHEDRINE SULFATE 50 MG/ML
INJECTION, SOLUTION INTRAVENOUS AS NEEDED
Status: DISCONTINUED | OUTPATIENT
Start: 2018-07-09 | End: 2018-07-09 | Stop reason: HOSPADM

## 2018-07-09 RX ORDER — ACETAMINOPHEN 500 MG
1000 TABLET ORAL EVERY 6 HOURS
Status: DISCONTINUED | OUTPATIENT
Start: 2018-07-10 | End: 2018-07-12 | Stop reason: HOSPADM

## 2018-07-09 RX ORDER — DEXAMETHASONE SODIUM PHOSPHATE 4 MG/ML
INJECTION, SOLUTION INTRA-ARTICULAR; INTRALESIONAL; INTRAMUSCULAR; INTRAVENOUS; SOFT TISSUE AS NEEDED
Status: DISCONTINUED | OUTPATIENT
Start: 2018-07-09 | End: 2018-07-09 | Stop reason: HOSPADM

## 2018-07-09 RX ORDER — ONDANSETRON 8 MG/1
8 TABLET, ORALLY DISINTEGRATING ORAL
Qty: 60 TAB | Refills: 0 | Status: SHIPPED | OUTPATIENT
Start: 2018-07-09 | End: 2019-01-07

## 2018-07-09 RX ORDER — LISINOPRIL 5 MG/1
10 TABLET ORAL DAILY
Status: DISCONTINUED | OUTPATIENT
Start: 2018-07-10 | End: 2018-07-12 | Stop reason: HOSPADM

## 2018-07-09 RX ORDER — MONTELUKAST SODIUM 10 MG/1
10 TABLET ORAL
Status: DISCONTINUED | OUTPATIENT
Start: 2018-07-09 | End: 2018-07-12 | Stop reason: HOSPADM

## 2018-07-09 RX ORDER — CEFAZOLIN SODIUM/WATER 2 G/20 ML
2 SYRINGE (ML) INTRAVENOUS ONCE
Status: DISCONTINUED | OUTPATIENT
Start: 2018-07-09 | End: 2018-07-09 | Stop reason: SDUPTHER

## 2018-07-09 RX ORDER — SODIUM CHLORIDE, SODIUM LACTATE, POTASSIUM CHLORIDE, CALCIUM CHLORIDE 600; 310; 30; 20 MG/100ML; MG/100ML; MG/100ML; MG/100ML
INJECTION, SOLUTION INTRAVENOUS
Status: DISCONTINUED | OUTPATIENT
Start: 2018-07-09 | End: 2018-07-09 | Stop reason: HOSPADM

## 2018-07-09 RX ORDER — ALBUTEROL SULFATE 90 UG/1
2 AEROSOL, METERED RESPIRATORY (INHALATION)
Status: DISCONTINUED | OUTPATIENT
Start: 2018-07-09 | End: 2018-07-09 | Stop reason: ALTCHOICE

## 2018-07-09 RX ORDER — DEXAMETHASONE SODIUM PHOSPHATE 100 MG/10ML
10 INJECTION INTRAMUSCULAR; INTRAVENOUS ONCE
Status: ACTIVE | OUTPATIENT
Start: 2018-07-10 | End: 2018-07-11

## 2018-07-09 RX ORDER — LORAZEPAM 1 MG/1
0.5 TABLET ORAL
Status: DISCONTINUED | OUTPATIENT
Start: 2018-07-09 | End: 2018-07-12 | Stop reason: HOSPADM

## 2018-07-09 RX ORDER — NALOXONE HYDROCHLORIDE 0.4 MG/ML
.2-.4 INJECTION, SOLUTION INTRAMUSCULAR; INTRAVENOUS; SUBCUTANEOUS
Status: DISCONTINUED | OUTPATIENT
Start: 2018-07-09 | End: 2018-07-12 | Stop reason: HOSPADM

## 2018-07-09 RX ORDER — HYDROCODONE BITARTRATE AND ACETAMINOPHEN 7.5; 325 MG/1; MG/1
1 TABLET ORAL
Status: DISCONTINUED | OUTPATIENT
Start: 2018-07-09 | End: 2018-07-12 | Stop reason: HOSPADM

## 2018-07-09 RX ORDER — SODIUM CHLORIDE 0.9 % (FLUSH) 0.9 %
5-10 SYRINGE (ML) INJECTION AS NEEDED
Status: DISCONTINUED | OUTPATIENT
Start: 2018-07-09 | End: 2018-07-12 | Stop reason: HOSPADM

## 2018-07-09 RX ADMIN — FENTANYL CITRATE 50 MCG: 50 INJECTION INTRAMUSCULAR; INTRAVENOUS at 09:18

## 2018-07-09 RX ADMIN — METOPROLOL TARTRATE 25 MG: 25 TABLET ORAL at 20:34

## 2018-07-09 RX ADMIN — SODIUM CHLORIDE, SODIUM LACTATE, POTASSIUM CHLORIDE, CALCIUM CHLORIDE: 600; 310; 30; 20 INJECTION, SOLUTION INTRAVENOUS at 10:00

## 2018-07-09 RX ADMIN — Medication 2 G: at 10:03

## 2018-07-09 RX ADMIN — TUBERCULIN PURIFIED PROTEIN DERIVATIVE 5 UNITS: 5 INJECTION, SOLUTION INTRADERMAL at 07:53

## 2018-07-09 RX ADMIN — ASPIRIN 81 MG: 81 TABLET, COATED ORAL at 20:33

## 2018-07-09 RX ADMIN — DEXAMETHASONE SODIUM PHOSPHATE 10 MG: 4 INJECTION, SOLUTION INTRA-ARTICULAR; INTRALESIONAL; INTRAMUSCULAR; INTRAVENOUS; SOFT TISSUE at 10:26

## 2018-07-09 RX ADMIN — Medication 500 MG: at 17:34

## 2018-07-09 RX ADMIN — ATORVASTATIN CALCIUM 20 MG: 10 TABLET, FILM COATED ORAL at 22:15

## 2018-07-09 RX ADMIN — ACETAMINOPHEN 1000 MG: 10 INJECTION, SOLUTION INTRAVENOUS at 17:33

## 2018-07-09 RX ADMIN — PROPOFOL 50 MCG/KG/MIN: 10 INJECTION, EMULSION INTRAVENOUS at 10:17

## 2018-07-09 RX ADMIN — EPHEDRINE SULFATE 10 MG: 50 INJECTION, SOLUTION INTRAVENOUS at 11:23

## 2018-07-09 RX ADMIN — VANCOMYCIN HYDROCHLORIDE 1 G: 1 INJECTION, POWDER, LYOPHILIZED, FOR SOLUTION INTRAVENOUS at 10:00

## 2018-07-09 RX ADMIN — ALBUTEROL SULFATE 2.5 MG: 2.5 SOLUTION RESPIRATORY (INHALATION) at 19:54

## 2018-07-09 RX ADMIN — HYDROCODONE BITARTRATE AND ACETAMINOPHEN 1 TABLET: 7.5; 325 TABLET ORAL at 20:33

## 2018-07-09 RX ADMIN — SODIUM CHLORIDE 25 ML/HR: 900 INJECTION, SOLUTION INTRAVENOUS at 14:00

## 2018-07-09 RX ADMIN — TRANEXAMIC ACID 1300 MG: 650 TABLET, FILM COATED ORAL at 17:33

## 2018-07-09 RX ADMIN — MONTELUKAST SODIUM 10 MG: 10 TABLET, FILM COATED ORAL at 22:15

## 2018-07-09 RX ADMIN — THEOPHYLLINE 300 MG: 300 TABLET, EXTENDED RELEASE ORAL at 20:34

## 2018-07-09 RX ADMIN — TRANEXAMIC ACID 1000 MG: 100 INJECTION, SOLUTION INTRAVENOUS at 11:50

## 2018-07-09 RX ADMIN — SODIUM CHLORIDE, SODIUM LACTATE, POTASSIUM CHLORIDE, CALCIUM CHLORIDE: 600; 310; 30; 20 INJECTION, SOLUTION INTRAVENOUS at 10:30

## 2018-07-09 RX ADMIN — SODIUM CHLORIDE, SODIUM LACTATE, POTASSIUM CHLORIDE, CALCIUM CHLORIDE: 600; 310; 30; 20 INJECTION, SOLUTION INTRAVENOUS at 11:36

## 2018-07-09 RX ADMIN — ONDANSETRON 4 MG: 2 INJECTION INTRAMUSCULAR; INTRAVENOUS at 11:41

## 2018-07-09 RX ADMIN — SODIUM CHLORIDE, SODIUM LACTATE, POTASSIUM CHLORIDE, AND CALCIUM CHLORIDE 999 ML/HR: 600; 310; 30; 20 INJECTION, SOLUTION INTRAVENOUS at 08:31

## 2018-07-09 RX ADMIN — EPHEDRINE SULFATE 10 MG: 50 INJECTION, SOLUTION INTRAVENOUS at 10:43

## 2018-07-09 RX ADMIN — EPHEDRINE SULFATE 10 MG: 50 INJECTION, SOLUTION INTRAVENOUS at 10:52

## 2018-07-09 RX ADMIN — ALBUTEROL SULFATE 2.5 MG: 2.5 SOLUTION RESPIRATORY (INHALATION) at 15:42

## 2018-07-09 RX ADMIN — VANCOMYCIN HYDROCHLORIDE 1000 MG: 1 INJECTION, POWDER, LYOPHILIZED, FOR SOLUTION INTRAVENOUS at 09:03

## 2018-07-09 RX ADMIN — TRANEXAMIC ACID 1000 MG: 100 INJECTION, SOLUTION INTRAVENOUS at 10:25

## 2018-07-09 RX ADMIN — Medication 2 G: at 17:33

## 2018-07-09 RX ADMIN — LORAZEPAM 0.5 MG: 1 TABLET ORAL at 17:34

## 2018-07-09 NOTE — PROGRESS NOTES
TRANSFER - IN REPORT:    Verbal report received from Piotr RN (name) on [facility]  being received from PACU (unit) for routine progression of care      Report consisted of patients Situation, Background, Assessment and   Recommendations(SBAR). Information from the following report(s) SBAR, Kardex, OR Summary, Procedure Summary, Intake/Output, MAR, Accordion, Recent Results and Med Rec Status was reviewed with the receiving nurse. Opportunity for questions and clarification was provided. Assessment completed upon patients arrival to unit and care assumed.

## 2018-07-09 NOTE — PERIOP NOTES
TRANSFER - OUT REPORT:    Verbal report given to Juan Pablo RENTERIA on Bolivar Feliz  being transferred to preop block for routine progression of care       Report consisted of patients Situation, Background, Assessment and   Recommendations(SBAR). Information from the following report(s) SBAR, MAR and Recent Results was reviewed with the receiving nurse. Lines:   Peripheral IV 07/09/18 Left; Lower Antecubital (Active)   Site Assessment Clean, dry, & intact 7/9/2018  7:50 AM   Phlebitis Assessment 0 7/9/2018  7:50 AM   Dressing Status Clean, dry, & intact 7/9/2018  7:50 AM   Dressing Type Transparent;Tape 7/9/2018  7:50 AM   Hub Color/Line Status Green; Infusing 7/9/2018  7:50 AM   Action Taken Blood drawn 7/9/2018  7:50 AM        Opportunity for questions and clarification was provided. Patient transported with:   Elana March will start Vanco in preop.

## 2018-07-09 NOTE — H&P
[id number] [facility]  Pre Operative History and Physical Exam    Patient ID:  Luisa Jose  [id number]  57 y.o.  6/28/1925    Today: July 9, 2018          CC:  Right  Knee pain    HPI:   The patient has end stage arthritis of the right knee. The patient was evaluated and examined during a consultation prior to today. The patient complains of knee pain with activities, reports pain as mostly occurring along the joint lines, reports stiffness of the knee with prolonged inactivity, and swelling/pain at the end of the day and after increased physical activity. The pain affects the patients activities of daily living and quality of life. The patient has attempted and exhausted conservative treatment. There have been no changes to the patient's orthopedic condition since the last office visit.     Past Medical History:  Past Medical History:   Diagnosis Date    Arthritis     Asthma     mild    Breast CA ([address]     rt- lumpectomy, radiation    Cardiac arrhythmia 03/2013    a-fib    Chronic obstructive pulmonary disease (HCC)     prn inhaler; on 2L/NC at bedtime     Chronic pain     right knee    Former smoker     GERD (gastroesophageal reflux disease)     controlled with med     Heart failure ([address]     Heart murmur     last echo 10/14/16    High cholesterol     Hypertension     on med for control     Incontinence in female     Migraine     Mild aortic stenosis     Oxygen dependent     mainly at night 2L/NC    Thyroid disease     hypo       Past Surgical History:  Past Surgical History:   Procedure Laterality Date    HX APPENDECTOMY      HX BREAST LUMPECTOMY Right 10/31/2012    HX CHOLECYSTECTOMY      HX HYSTERECTOMY      HX KNEE ARTHROSCOPY Right     HX OPEN REDUCTION INTERNAL FIXATION Right 05/31/2018    RIGHT DISTAL RADIUS     HX OTHER SURGICAL      colpocleisis     HX SPLENECTOMY  1998        Medications:     Prior to Admission medications    Medication Sig Start Date End Date Taking? Authorizing Provider   fluticasone-vilanterol (BREO ELLIPTA) 200-25 mcg/dose inhaler Take 1 Puff by inhalation daily. RINSE MOUTH WELL AFTER USE 7/5/18   Jailene Simpson NP   mupirocin calcium (BACTROBAN) 2 % nasal ointment by Both Nostrils route two (2) times a day. Historical Provider   aspirin delayed-release 81 mg tablet Take 81 mg by mouth every other day. Take / use AM day of surgery  per anesthesia protocols if due    Historical Provider   ALBUTEROL IN Take  by inhalation daily as needed. Take / use AM day of surgery  per anesthesia protocols if needed. Historical Provider   theophylline ER,12 hour, (THEOCHRON) 300 mg tablet Take 300 mg by mouth two (2) times a day. Take / use AM day of surgery  per anesthesia protocols. Historical Provider   montelukast (SINGULAIR) 10 mg tablet Take 1 Tab by mouth nightly. 12/7/17   Nguyen Saavedra MD   atorvastatin (LIPITOR) 20 mg tablet Take 1 Tab by mouth daily. Patient taking differently: Take 20 mg by mouth nightly. 12/7/17   Nguyen Saavedra MD   LORazepam (ATIVAN) 0.5 mg tablet Take 1 Tab by mouth nightly. Max Daily Amount: 0.5 mg. Patient taking differently: Take 0.5 mg by mouth daily (after dinner). 12/7/17   Nguyen Saavedra MD   omeprazole (PRILOSEC) 20 mg capsule Take 1 Cap by mouth daily. 12/7/17   Nguyen Saavedra MD   levothyroxine (SYNTHROID) 50 mcg tablet Take 1 Tab by mouth Daily (before breakfast). 12/7/17   Nguyen Saavedra MD   lisinopril (PRINIVIL, ZESTRIL) 10 mg tablet Take 1 Tab by mouth daily. 12/7/17   Nguyen Saavedra MD   furosemide (LASIX) 20 mg tablet Take 1 tablet daily, alternate with 1/2 tablet daily. Patient taking differently: 20 mg daily. Take 1 tablet daily, alternate with 1/2 tablet daily. 12/7/17   Nguyen Saavedra MD   metoprolol tartrate (LOPRESSOR) 25 mg tablet Take 1 Tab by mouth two (2) times a day. 11/10/17   Nguyen Saavedra MD   magnesium 250 mg tab Take 250 mg by mouth daily.     Historical Provider denosumab (PROLIA) 60 mg/mL injection 1 mL by SubCUTAneous route once for 1 dose. Every 6 months, last shot 6/9/16        May draw labs if needed     Original order is good for 6 mths  Indications: POST-MENOPAUSAL OSTEOPOROSIS  Patient taking differently: 60 mg by SubCUTAneous route once. Every 6 months, last shot 04/2018      May draw labs if needed     Original order is good for 6 mths  Indications: POST-MENOPAUSAL OSTEOPOROSIS 6/8/17 6/26/18  Gaby Guzman MD   calcium carbonate (OS-EMERITA) 500 mg calcium (1,250 mg) tablet Take 1 Tab by mouth two (2) times a day. Historical Provider   VITAMIN E, DL,TOCOPHERYL ACET, (VITAMIN E ACETATE) 400 unit cap capsule Take 400 Units by mouth daily. Historical Provider   Cholecalciferol, Vitamin D3, (VITAMIN D3) 1,000 unit cap Take 1 Tab by mouth daily. Historical Provider   albuterol (VENTOLIN HFA) 90 mcg/actuation inhaler Take 2 Puffs by inhalation every four (4) hours as needed for Wheezing or Shortness of Breath. Take / use AM day of surgery  per anesthesia protocols if needed. Indications: Acute Asthma Attack, Chronic Obstructive Pulmonary Disease    Historical Provider       Family History:     Family History   Problem Relation Age of Onset    Cancer Mother        Social History:      Social History   Substance Use Topics    Smoking status: Former Smoker     Packs/day: 1.00     Years: 45.00     Types: Cigarettes     Quit date: 8/1/1992    Smokeless tobacco: Never Used    Alcohol use No         Allergies: Allergies   Allergen Reactions    Codeine Itching    Phenobarbital Unknown (comments)    Tramadol Itching        Vitals: There were no vitals taken for this visit. Objective:         General: No Acute distress                   HEENT: Normocephalic/atramatic                   Lungs:  Breathing non-labored                   Heart:   RRR                    Abdomen: soft       Extremities:  Pain with ROM of the right knee. Trace effusion. Crepitus present. Distally the patient shows no neurologic deficit. Antalgic gait appreciated. Meds:   No current facility-administered medications for this encounter. Patient Active Problem List   Diagnosis Code    Simple chronic bronchitis (HCC) J41.0    HTN (hypertension) I10    Pure hypercholesterolemia E78.00    History of breast cancer Z85.3    Osteoporosis M81.0    Memory loss R41.3    Hallucinations, visual R44.1    Osteoarthritis of both knees M17.0    Chronic kidney disease, stage III (moderate) N18.3    H/O splenectomy Z90.81    Chronic pain syndrome G89.4    Congestive heart failure (HCC) I50.9    Nonsustained ventricular tachycardia (HCC) I47.2    Abnormal urinalysis R82.90    Baseline forced expiratory volume at end of one second (FEV1) 30% to 80% predicted Z78.9       Assessment:   1. Arthritis of the Right knee    Plan:   The patient has failed previous conservative treatment for this condition including anti-inflammatories, injections and lifestyle modifications. The necessity for joint replacement is present. Risks, benefits, alternatives and possible complications of right knee arthroplasty have been discussed with the patient including but not limited to potential for infection, bleeding, damage to nerves and/or blood vessels, stiffness of the knee after surgery, knee instability after surgery, patellar maltracking, potential for fracture both intra-op and post-op, polyethylene wear, implant loosening, and risk for revision surgery secondary to but not limited to these discussed risks. Further, we have discussed potential for venous thrombo-embolism including deep vein thrombosis and pulmonary embolism despite being on prophylaxis. Additionally, we have discussed potential for continued pain after surgery and patient has voiced understanding that I can make no guarantees regarding the pain relief of outcomes after surgery.  We have also previously discussed the potential of morbidity and mortality associated with, but not limited to, the actual surgical procedure, anesthesia, prior medical conditions, and/or the administration of medications perioperatively. The patient has been given the opportunity to ask questions all of which have been answered and the patient wishes to proceed. The patient will be admitted the day of surgery for right total knee replacement.         Signed By: Clyde Corcoran MD  July 9, 2018

## 2018-07-09 NOTE — PROGRESS NOTES
Problem: Mobility Impaired (Adult and Pediatric)  Goal: *Acute Goals and Plan of Care (Insert Text)  GOALS (1-4 days):  (1.)Ms. Tashi Jacome will move from supine to sit and sit to supine  in bed with SUPERVISION. (2.)Ms. Tashi Jacome will transfer from bed to chair and chair to bed with STAND BY ASSIST using the least restrictive device. (3.)Ms. Tashi Jacome will ambulate with STAND BY ASSIST for  feet with the least restrictive device. (4.)Ms. Tashi Jacome will ambulate up/down 1 steps with No railing with MINIMAL ASSIST with walker. (5.)Ms. Tashi Jacome will increase right knee ROM to 5°-80°.  ________________________________________________________________________________________________    PHYSICAL THERAPY [facility] tKa: Initial Assessment, Treatment Day: Day of Assessment, PM 7/9/2018  INPATIENT: Hospital Day: 1  [place] / Product Type: Medicare /      NAME/AGE/GENDER: Beka Levi is a 80 y.o. female   PRIMARY DIAGNOSIS:  Osteoarthritis of right knee, unspecified osteoarthritis type [M17.11]   Procedure(s) and Anesthesia Type:     * RIGHT KNEE ARTHROPLASTY TOTAL SOUMYA CEMENTED / ANCEF 2gm / Renaee Revel - Spinal (Right)  ICD-10: Treatment Diagnosis:    · Pain in Right Knee (M25.561)  · Stiffness of Right Knee, Not elsewhere classified (M25.661)  · Difficulty in walking, Not elsewhere classified (R26.2)      ASSESSMENT:     Ms. Tashi Jacome presents with impaired strength & mobility s/p right TKA. Pt also had decreased stability during out of bed activity. Pt will benefit from follow up therapy to help restore safe function prior to returning home with caregiver. If no caregiver available on DC pt will need a rehab stay at [facility]. This section established at most recent assessment   PROBLEM LIST (Impairments causing functional limitations):  1. Decreased Strength  2. Decreased ADL/Functional Activities  3. Decreased Transfer Abilities  4.  Decreased Ambulation Ability/Technique  5. Decreased Balance  6. Increased Pain  7. Decreased Activity Tolerance  8. Decreased Flexibility/Joint Mobility  9. Decreased [place] with Home Exercise Program   INTERVENTIONS PLANNED: (Benefits and precautions of physical therapy have been discussed with the patient.)  1. Bed Mobility  2. Gait Training  3. Home Exercise Program (HEP)  4. Therapeutic Exercise/Strengthening  5. Transfer Training  6. Range of Motion: active/assisted/passive  7. Therapeutic Activities  8. Group Therapy     TREATMENT PLAN: Frequency/Duration: Follow patient BID for duration of hospital stay to address above goals. Rehabilitation Potential For Stated Goals: Good     RECOMMENDED REHABILITATION/EQUIPMENT: (at time of discharge pending progress): Continue Skilled Therapy and Rehab. HISTORY:   History of Present Injury/Illness (Reason for Referral): The patient has end stage arthritis of the right knee. The patient was evaluated and examined during a consultation prior to today. The patient complains of knee pain with activities, reports pain as mostly occurring along the joint lines, reports stiffness of the knee with prolonged inactivity, and swelling/pain at the end of the day and after increased physical activity. The pain affects the patients activities of daily living and quality of life. The patient has attempted and exhausted conservative treatment. There have been no changes to the patient's orthopedic condition since the last office visit.     Past Medical History/Comorbidities:   Ms. Justin Humphreys  has a past medical history of Arthritis; Asthma; Breast CA ([address] Cardiac arrhythmia (03/2013); Chronic obstructive pulmonary disease ([address] Chronic pain; Former smoker; GERD (gastroesophageal reflux disease); Heart failure ([address] Heart murmur; High cholesterol; Hypertension; Incontinence in female; Migraine; Mild aortic stenosis; Oxygen dependent; and Thyroid disease.  She also has no past medical history of Adverse effect of anesthesia; Aneurysm ([address] Autoimmune disease ([address] CAD (coronary artery disease); Chronic kidney disease; Coagulation disorder ([address] Diabetes ([address] Difficult intubation; Endocarditis; Ill-defined condition; Liver disease; Malignant hyperthermia due to anesthesia; Morbid obesity ([address] Nausea & vomiting; Nicotine vapor product user; Non-nicotine vapor product user; Pseudocholinesterase deficiency; Psychiatric disorder; PUD (peptic ulcer disease); Rheumatic fever; Seizures ([address] Sleep apnea; Stroke [facility]); or Thromboembolus ([address] Ms. Torsten Santos  has a past surgical history that includes hx hysterectomy; hx appendectomy; hx splenectomy (1998); hx cholecystectomy; hx breast lumpectomy (Right, 10/31/2012); hx open reduction internal fixation (Right, 05/31/2018); hx other surgical; and hx knee arthroscopy (Right). Social History/Living Environment:   Home Environment: Private residence  # Steps to Enter: 1  Rails to Enter: No  One/Two Story Residence: One story  Living Alone: No  Support Systems: Family member(s)  Patient Expects to be Discharged to[de-identified] Skilled nursing facility  Current DME Used/Available at Home: None  Tub or Shower Type: Tub/Shower combination  Prior Level of Function/Work/Activity:  Pt was non-ambulatory for the last 6 weeks, but the last 2 weeks of this 6 week period prior to this admission, this pt was needing SBA for transfers only.    Number of Personal Factors/Comorbidities that affect the Plan of Care: 3+: HIGH COMPLEXITY   EXAMINATION:   Most Recent Physical Functioning:   Gross Assessment: Yes  Gross Assessment  AROM: Generally decreased, functional (left LE)  Strength: Generally decreased, functional (left LE)  Coordination: Generally decreased, functional (left LE)         RLE PROM  R Knee Flexion: 60 (~post op)  R Knee Extension: -15 (~post op)           Bed Mobility  Supine to Sit: Contact guard assistance  Sit to Supine:  (NT)  Scooting: Contact guard assistance    Transfers  Sit to Stand: Minimum assistance  Stand to Sit: Minimum assistance  Bed to Chair: Minimum assistance (with walker)    Balance  Sitting: Intact; Without support  Standing: Impaired; With support (walker)              Weight Bearing Status  Right Side Weight Bearing: As tolerated  Distance (ft): 10 Feet (ft)  Ambulation - Level of Assistance: Minimal assistance  Speed/Alissa: Shuffled; Slow  Step Length: Left shortened;Right shortened  Stance: Right decreased  Gait Abnormalities: Antalgic;Decreased step clearance        Braces/Orthotics: none    Right Knee Cold  Type: Cryocuff      Body Structures Involved:  1. Joints  2. Muscles Body Functions Affected:  1. Sensory/Pain  2. Movement Related Activities and Participation Affected:  1. General Tasks and Demands  2. Mobility   Number of elements that affect the Plan of Care: 3: MODERATE COMPLEXITY   CLINICAL PRESENTATION:   Presentation: Evolving clinical presentation with unstable and unpredictable characteristics: HIGH COMPLEXITY   CLINICAL DECISION MAKING:   [facility] AM-PAC 6 Clicks   Basic Mobility Inpatient Short Form  How much difficulty does the patient currently have. .. Unable A Lot A Little None   1. Turning over in bed (including adjusting bedclothes, sheets and blankets)? [] 1   [] 2   [x] 3   [] 4   2. Sitting down on and standing up from a chair with arms ( e.g., wheelchair, bedside commode, etc.)   [] 1   [] 2   [x] 3   [] 4   3. Moving from lying on back to sitting on the side of the bed? [] 1   [] 2   [x] 3   [] 4   How much help from another person does the patient currently need. .. Total A Lot A Little None   4. Moving to and from a bed to a chair (including a wheelchair)? [] 1   [] 2   [x] 3   [] 4   5. Need to walk in hospital room? [] 1   [] 2   [x] 3   [] 4   6. Climbing 3-5 steps with a railing? [x] 1   [] 2   [] 3   [] 4   © 2007, Trustees of [facility], under license to [employer].  All rights reserved        Score:  Initial: 16 Most Recent: X (Date: -- )    Interpretation of Tool:  Represents activities that are increasingly more difficult (i.e. Bed mobility, Transfers, Gait). Score 24 23 22-20 19-15 14-10 9-7 6     Modifier CH CI CJ CK CL CM CN      ? Mobility - Walking and Moving Around:     - CURRENT STATUS: CK - 40%-59% impaired, limited or restricted    - GOAL STATUS: CJ - 20%-39% impaired, limited or restricted    - D/C STATUS:  ---------------To be determined---------------  [place] / Product Type: Medicare /      Medical Necessity:     · Patient is expected to demonstrate progress in strength, range of motion, balance, coordination and functional technique to decrease assistance required with bed mobility, transfers & gait. Reason for Services/Other Comments:  · Patient continues to require skilled intervention due to pt unsafe with functional mobility. Use of outcome tool(s) and clinical judgement create a POC that gives a: Difficult prediction of patient's progress: HIGH COMPLEXITY            TREATMENT:   (In addition to Assessment/Re-Assessment sessions the following treatments were rendered)     Pre-treatment Symptoms/Complaints:  weakness  Pain: Initial: visual scale  Pain Intensity 1: 0  Pain Location 1: Knee  Pain Orientation 1: Right  Pain Intervention(s) 1: Ambulation/Increased Activity, Cold pack  Post Session:  0/10     Therapeutic Exercise: (12 Minutes):  Exercises per grid below to improve mobility and dynamic movement of leg - right to improve functional endurance. Required minimal verbal cues to promote proper body alignment and promote proper body mechanics. Progressed range and repetitions as indicated.   Assessment/11 min     Date:  7/9 Date:   Date:     ACTIVITY/EXERCISE AM PM AM PM AM PM   GROUP THERAPY  []  []  []  []  []  []   Ankle Pumps  10       Quad Sets  10       Gluteal Sets  10       Hip ABd/ADduction  10       Straight Leg Raises  10       Knee Slides  10       Short Arc Quads  10       Long Arc Quads         Chair Slides                  B = bilateral; AA = active assistive; A = active; P = passive      Treatment/Session Assessment:     Response to Treatment:  Tolerated fair, easily fatigued with minimal exertion    Education:  [x] Home Exercises  [x] Fall Precautions  [] Hip Precautions [] D/C Instruction Review  [] Knee/Hip Prosthesis Review  [x] Walker Management/Safety [] Adaptive Equipment as Needed       Interdisciplinary Collaboration:   o Occupational Therapist  o Registered Nurse    After treatment position/precautions:   o Up in chair  o Bed/Chair-wheels locked  o Caregiver at bedside  o Call light within reach  o RN notified  o Family at bedside    Compliance with Program/Exercises: Will assess as treatment progresses. Recommendations/Intent for next treatment session:  Treatment next visit will focus on increasing Ms. Vila's independence with bed mobility, transfers, gait training, strength/ROM exercises, modalities for pain, and patient education.       Total Treatment Duration:  PT Patient Time In/Time Out  Time In: 1505  Time Out: 100 Omar Neville, PT

## 2018-07-09 NOTE — PROGRESS NOTES
Admission Assessment Complete. Pt had RTKA today. . Pt is A&Ox 3.  +2 pedal pulses with purposeful movement in all four extremities. Dressing is clean, dry and intact. IVF inusing. Sage is draining straw yellow urine. Pt denies any pain or need at this time. Bed low and locked. Side rails x3. Call light with in reach. Pt verbalizes understanding of call light.

## 2018-07-09 NOTE — ANESTHESIA POSTPROCEDURE EVALUATION
Post-Anesthesia Evaluation and Assessment    Patient: Annette Aguirre MRN: [id number]  SSN: [id number]    YOB: 1925  Age: 80 y.o. Sex: female       Cardiovascular Function/Vital Signs  Visit Vitals    /63    Pulse 60    Temp 37.2 °C (99 °F)    Resp 16    SpO2 98%       Patient is status post spinal anesthesia for Procedure(s):  RIGHT KNEE ARTHROPLASTY TOTAL SOUMYA CEMENTED / ANCEF 2gm / Ora Rehana. Nausea/Vomiting: None    Postoperative hydration reviewed and adequate. Pain:  Pain Scale 1: Numeric (0 - 10) (07/09/18 1309)  Pain Intensity 1: 0 (07/09/18 1309)   Managed    Neurological Status:   Neuro (WDL): Exceptions to WDL (07/09/18 1309)  Neuro  Neurologic State: Alert (07/09/18 1309)  Orientation Level: Oriented X4 (07/09/18 1309)  LUE Motor Response: Purposeful (07/09/18 1309)  LLE Motor Response: Purposeful (07/09/18 1309)  RUE Motor Response: Purposeful (07/09/18 1309)  RLE Motor Response: Tingling;Weak (07/09/18 1309)   At baseline    Mental Status and Level of Consciousness: Arousable    Pulmonary Status:   O2 Device: Room air (07/09/18 1309)   Adequate oxygenation and airway patent    Complications related to anesthesia: None    Post-anesthesia assessment completed.  No concerns    Signed By: Zane Sanchez MD     July 9, 2018

## 2018-07-09 NOTE — CONSULTS
HOSPITALIST H&P/CONSULT  NAME:  Carver Nageotte   Age:  80 y.o.  :   1925   MRN:   [id number]  PCP: Jai Gonzales MD  Consulting MD:  Treatment Team: Attending Provider: Emil Cardenas MD; Care Manager: HANY Bailey; Consulting Provider: Horace Kirk MD  HPI:   Patient is a 80yoF who is s/p R TKA with PMH significant for HTN, CHF. Patient is doing very well post-op. She has absolutely no complaints. She is eating lunch of grilled cheese. She is not requiring supplemental oxygen. Feels well. Denies SOB, chest pain. Complete ROS done and is as stated in HPI or otherwise negative  Past Medical History:   Diagnosis Date    Arthritis     Asthma     mild    Breast CA ([address]     rt- lumpectomy, radiation    Cardiac arrhythmia 2013    a-fib    Chronic obstructive pulmonary disease (HCC)     prn inhaler; on 2L/NC at bedtime     Chronic pain     right knee    Former smoker     GERD (gastroesophageal reflux disease)     controlled with med     Heart failure ([address]     Heart murmur     last echo 10/14/16    High cholesterol     Hypertension     on med for control     Incontinence in female     Migraine     Mild aortic stenosis     Oxygen dependent     mainly at night 2L/NC    Thyroid disease     hypo      Past Surgical History:   Procedure Laterality Date    HX APPENDECTOMY      HX BREAST LUMPECTOMY Right 10/31/2012    HX CHOLECYSTECTOMY      HX HYSTERECTOMY      HX KNEE ARTHROSCOPY Right     HX OPEN REDUCTION INTERNAL FIXATION Right 2018    RIGHT DISTAL RADIUS     HX OTHER SURGICAL      colpocleisis     HX SPLENECTOMY  1998      Prior to Admission Medications   Prescriptions Last Dose Informant Patient Reported? Taking? ALBUTEROL IN 2018 at Unknown time  Yes Yes   Sig: by Nebulization route daily as needed. Take / use AM day of surgery  per anesthesia protocols if needed.    Cholecalciferol, Vitamin D3, (VITAMIN D3) 1,000 unit cap 2018 at Unknown time  Yes Yes   Sig: Take 1 Tab by mouth daily. LORazepam (ATIVAN) 0.5 mg tablet 2018 at Unknown time  No Yes   Sig: Take 1 Tab by mouth nightly. Max Daily Amount: 0.5 mg. Patient taking differently: Take 0.5 mg by mouth daily (after dinner). VITAMIN E, DL,TOCOPHERYL ACET, (VITAMIN E ACETATE) 400 unit cap capsule 2018 at Unknown time  Yes Yes   Sig: Take 400 Units by mouth daily. albuterol (VENTOLIN HFA) 90 mcg/actuation inhaler 2018 at Unknown time  Yes Yes   Sig: Take 2 Puffs by inhalation every four (4) hours as needed for Wheezing or Shortness of Breath. Take / use AM day of surgery  per anesthesia protocols if needed. Indications: Acute Asthma Attack, Chronic Obstructive Pulmonary Disease   aspirin delayed-release 81 mg tablet 2018 at Unknown time  Yes Yes   Sig: Take 81 mg by mouth every other day. Take / use AM day of surgery  per anesthesia protocols if due   atorvastatin (LIPITOR) 20 mg tablet 2018 at Unknown time  No Yes   Sig: Take 1 Tab by mouth daily. Patient taking differently: Take 20 mg by mouth nightly. calcium carbonate (OS-EMERITA) 500 mg calcium (1,250 mg) tablet 2018 at Unknown time  Yes Yes   Sig: Take 1 Tab by mouth two (2) times a day. denosumab (PROLIA) 60 mg/mL injection   No No   Si mL by SubCUTAneous route once for 1 dose. Every 6 months, last shot 16        May draw labs if needed     Original order is good for 6 mths  Indications: POST-MENOPAUSAL OSTEOPOROSIS   Patient taking differently: 60 mg by SubCUTAneous route once. Every 6 months, last shot 2018      May draw labs if needed     Original order is good for 6 mths  Indications: POST-MENOPAUSAL OSTEOPOROSIS   fluticasone-vilanterol (BREO ELLIPTA) 200-25 mcg/dose inhaler Not Taking at Unknown time  No No   Sig: Take 1 Puff by inhalation daily.  RINSE MOUTH WELL AFTER USE   furosemide (LASIX) 20 mg tablet 2018 at Unknown time  No Yes   Sig: Take 1 tablet daily, alternate with 1/2 tablet daily. Patient taking differently: 20 mg daily. Take 1 tablet daily, alternate with 1/2 tablet daily. levothyroxine (SYNTHROID) 50 mcg tablet 2018 at Unknown time  No Yes   Sig: Take 1 Tab by mouth Daily (before breakfast). lisinopril (PRINIVIL, ZESTRIL) 10 mg tablet 2018 at Unknown time  No Yes   Sig: Take 1 Tab by mouth daily. magnesium 250 mg tab 2018 at Unknown time  Yes Yes   Sig: Take 250 mg by mouth daily. metoprolol tartrate (LOPRESSOR) 25 mg tablet 2018 at 0530  No Yes   Sig: Take 1 Tab by mouth two (2) times a day. montelukast (SINGULAIR) 10 mg tablet 2018 at Unknown time  No Yes   Sig: Take 1 Tab by mouth nightly. mupirocin calcium (BACTROBAN) 2 % nasal ointment 2018 at Unknown time  Yes Yes   Sig: by Both Nostrils route two (2) times a day. omeprazole (PRILOSEC) 20 mg capsule 2018 at Unknown time  No Yes   Sig: Take 1 Cap by mouth daily. theophylline ER,12 hour, (THEOCHRON) 300 mg tablet 2018 at Unknown time  Yes Yes   Sig: Take 300 mg by mouth two (2) times a day. Take / use AM day of surgery  per anesthesia protocols.       Facility-Administered Medications: None     Allergies   Allergen Reactions    Codeine Itching    Phenobarbital Unknown (comments)    Tramadol Itching      Social History   Substance Use Topics    Smoking status: Former Smoker     Packs/day: 1.00     Years: 45.00     Types: Cigarettes     Quit date: 1992    Smokeless tobacco: Never Used    Alcohol use No      Family History   Problem Relation Age of Onset    Cancer Mother       Objective:     Visit Vitals    /73 (BP 1 Location: Left arm, BP Patient Position: At rest)    Pulse 61    Temp 97.3 °F (36.3 °C)    Resp 16    SpO2 97%      Temp (24hrs), Av °F (36.7 °C), Min:97.3 °F (36.3 °C), Max:99 °F (37.2 °C)    Oxygen Therapy  O2 Sat (%): 97 % (18 1406)  O2 Device: Room air (18 1406)  O2 Flow Rate (L/min): 2 l/min (18 2747)  Physical Exam:  General:    Alert, cooperative, no distress, appears stated age. Head:   Normocephalic, without obvious abnormality, atraumatic. Nose:  Nares normal. No drainage or sinus tenderness. Lungs:   Clear to auscultation bilaterally. No Wheezing or Rhonchi. No rales. Heart:   Regular rate and rhythm,  no murmur, rub or gallop. Abdomen:   Soft, non-tender. Not distended. Bowel sounds normal.   Extremities: No cyanosis. No edema. No clubbing  Skin:     Texture, turgor normal. No rashes or lesions. Not Jaundiced  Neurologic: Alert and oriented x 3, no focal deficits   Data Review:   Recent Results (from the past 24 hour(s))   TYPE & SCREEN    Collection Time: 07/09/18  7:52 AM   Result Value Ref Range    Crossmatch Expiration 07/12/2018     ABO/Rh(D) A POSITIVE     Antibody screen NEG    GLUCOSE, POC    Collection Time: 07/09/18  7:56 AM   Result Value Ref Range    Glucose (POC) 83 65 - 100 mg/dL   URINALYSIS W/ RFLX MICROSCOPIC    Collection Time: 07/09/18 10:15 AM   Result Value Ref Range    Color YELLOW      Appearance CLEAR      Specific gravity 1.009 1.001 - 1.023      pH (UA) 7.5 5.0 - 9.0      Protein NEGATIVE  NEG mg/dL    Glucose NEGATIVE  mg/dL    Ketone TRACE (A) NEG mg/dL    Bilirubin NEGATIVE  NEG      Blood NEGATIVE  NEG      Urobilinogen 0.2 0.2 - 1.0 EU/dL    Nitrites NEGATIVE  NEG      Leukocyte Esterase NEGATIVE  NEG       Imaging /Procedures /Studies     Assessment and Plan: Active Hospital Problems    Diagnosis Date Noted    OA (osteoarthritis) of knee 07/09/2018    Congestive heart failure ([address] 03/14/2017    HTN (hypertension) 08/21/2013       PLAN  R TKA  - Doing very well  - No complaints  - Managed by primary team    HTN  - BPs stable  - Home meds    CHF  - History of this  - Stable without exacerbation  - Continue home meds including lasix    Appreciate consultation. Medically stable. Will sign-off.     Signed By: Gus Caballero MD     July 9, 2018

## 2018-07-09 NOTE — PERIOP NOTES
Teach back method used in review of Incentive Spirometer(1000reached preop), Hibiclens usage preop, TB screening and pain management goals.

## 2018-07-09 NOTE — CONSULTS
Physical Medicine & Rehabilitation Note-consult    Patient: Tamica Rudd MRN: [id number]  SSN: [id number]    YOB: 1925  Age: 80 y.o. Sex: female      Admit Date: 7/9/2018  Admitting Physician: Dagmar Metzger MD    Medical Decision Making/Plan/Recommend: Gait impairment s/p right total knee arthroplasty. Patient is progressing fairly, with no unusual barriers to progress. PT, OT to continue for active/assisted/passive ROM, strengthening, mobility, transfers, gait training. Best care option is admission to a sub acute rehab program at [facility]. Patient will benefit from continued daily skilled rehabilitation efforts and regular medical and nursing care at SNF. Chief Complaint : Gait dysfunction secondary to below.   Admit Diagnosis: Osteoarthritis of right knee, unspecified osteoarthritis ty*  right total knee arthroplasty 7/9/2018  Pain  DVT risk  Post op hemorrhagic anemia  Congestive heart failure (HCC) (3/14/2017)  OA (osteoarthritis) of knee (7/9/2018)  Hypertension  Chronic obstructive pulmonary disease   Acute Rehab Dx:  Gait impairment  Mobility and ambulation deficits  Self Care/ADL deficits    Medical Dx:  Past Medical History:   Diagnosis Date    Arthritis     Asthma     mild    Breast CA ([address]     rt- lumpectomy, radiation    Cardiac arrhythmia 03/2013    a-fib    Chronic obstructive pulmonary disease (HCC)     prn inhaler; on 2L/NC at bedtime     Chronic pain     right knee    Former smoker     GERD (gastroesophageal reflux disease)     controlled with med     Heart failure ([address]     Heart murmur     last echo 10/14/16    High cholesterol     Hypertension     on med for control     Incontinence in female     Migraine     Mild aortic stenosis     Oxygen dependent     mainly at night 2L/NC    Thyroid disease     hypo     Subjective:     Date of Evaluation:  July 9, 2018    HPI: Tamica Rudd is a 80 y.o. female patient at [facility] who was admitted on 7/9/2018  by Leticia Maki MD with below mentioned medical history, is being seen for Physical Medicine and Rehabilitation consult. Beka Levi had right knee pain and discomfort with walking and activity due to end stage DJD. The symptoms were not well controlled with conservative management and has limited the patient's function. Patientunderwent a right total knee arthroplasty per Dr. Leticia Maki MD on 7/9/2018. The patient's post operative course has been uncomplicated. Patient's weight bearing status is to be WBAT RLE. Patient is starting to stand, taking steps with acute PT and OT. Patient still shows significant functional deficits due to right knee pain, decreased ROM and strength. Beka Levi is seen and examined today. Medical Records reviewed. Patient states she was independent but recently unable to walk due to pain. Previous Functional Level: Prior Level of Function/Work/Activity:  Pt was non-ambulatory for the last 6 weeks, but the last 2 weeks of this 6 week period prior to this admission, this pt was needing SBA for transfers only. Current Level of Function:   bed mobility - CGA, transfers - min A , decreased balance , ambulation - 10' with RW and min A .          Family History   Problem Relation Age of Onset    Cancer Mother       Social History   Substance Use Topics    Smoking status: Former Smoker     Packs/day: 1.00     Years: 45.00     Types: Cigarettes     Quit date: 8/1/1992    Smokeless tobacco: Never Used    Alcohol use No     Past Surgical History:   Procedure Laterality Date    HX APPENDECTOMY      HX BREAST LUMPECTOMY Right 10/31/2012    HX CHOLECYSTECTOMY      HX HYSTERECTOMY      HX KNEE ARTHROSCOPY Right     HX OPEN REDUCTION INTERNAL FIXATION Right 05/31/2018    RIGHT DISTAL RADIUS     HX OTHER SURGICAL      colpocleisis     HX SPLENECTOMY  1998      Prior to Admission medications    Medication Sig Start Date End Date Taking? Authorizing Provider   aspirin delayed-release 81 mg tablet Take 1 Tab by mouth every twelve (12) hours every twelve (12) hours. 7/9/18  Yes Lior Lewis MD   acetaminophen (TYLENOL) 500 mg tablet Take 2 Tabs by mouth every six (6) hours. 7/10/18  Yes Lior Lewis MD   HYDROcodone-acetaminophen (NORCO) 7.5-325 mg per tablet Take 1 Tab by mouth every four (4) hours as needed. Max Daily Amount: 6 Tabs. 7/9/18  Yes Lior Lewis MD   ondansetron (ZOFRAN ODT) 8 mg disintegrating tablet Take 1 Tab by mouth every eight (8) hours as needed for Nausea. 7/9/18  Yes Lior Lewis MD   senna-docusate (PERICOLACE) 8.6-50 mg per tablet Take 2 Tabs by mouth daily. 7/10/18  Yes Lior Lewis MD   mupirocin calcium (BACTROBAN) 2 % nasal ointment by Both Nostrils route two (2) times a day. Yes Historical Provider   aspirin delayed-release 81 mg tablet Take 81 mg by mouth every other day. Take / use AM day of surgery  per anesthesia protocols if due   Yes Historical Provider   ALBUTEROL IN by Nebulization route daily as needed. Take / use AM day of surgery  per anesthesia protocols if needed. Yes Historical Provider   theophylline ER,12 hour, (THEOCHRON) 300 mg tablet Take 300 mg by mouth two (2) times a day. Take / use AM day of surgery  per anesthesia protocols. Yes Historical Provider   montelukast (SINGULAIR) 10 mg tablet Take 1 Tab by mouth nightly. 12/7/17  Yes Nimisha Serna MD   atorvastatin (LIPITOR) 20 mg tablet Take 1 Tab by mouth daily. Patient taking differently: Take 20 mg by mouth nightly. 12/7/17  Yes Nimisha Serna MD   LORazepam (ATIVAN) 0.5 mg tablet Take 1 Tab by mouth nightly. Max Daily Amount: 0.5 mg. Patient taking differently: Take 0.5 mg by mouth daily (after dinner). 12/7/17  Yes Nimisha Serna MD   omeprazole (PRILOSEC) 20 mg capsule Take 1 Cap by mouth daily.  12/7/17  Yes Nimisha Serna MD   levothyroxine (SYNTHROID) 50 mcg tablet Take 1 Tab by mouth Daily (before breakfast). 12/7/17  Yes Mamie Soulier, MD   lisinopril (PRINIVIL, ZESTRIL) 10 mg tablet Take 1 Tab by mouth daily. 12/7/17  Yes Mamie Soulier, MD   furosemide (LASIX) 20 mg tablet Take 1 tablet daily, alternate with 1/2 tablet daily. Patient taking differently: 20 mg daily. Take 1 tablet daily, alternate with 1/2 tablet daily. 12/7/17  Yes Mamie Soulier, MD   metoprolol tartrate (LOPRESSOR) 25 mg tablet Take 1 Tab by mouth two (2) times a day. 11/10/17  Yes Mamie Soulier, MD   magnesium 250 mg tab Take 250 mg by mouth daily. Yes Historical Provider   calcium carbonate (OS-EMERITA) 500 mg calcium (1,250 mg) tablet Take 1 Tab by mouth two (2) times a day. Yes Historical Provider   VITAMIN E, DL,TOCOPHERYL ACET, (VITAMIN E ACETATE) 400 unit cap capsule Take 400 Units by mouth daily. Yes Historical Provider   Cholecalciferol, Vitamin D3, (VITAMIN D3) 1,000 unit cap Take 1 Tab by mouth daily. Yes Historical Provider   albuterol (VENTOLIN HFA) 90 mcg/actuation inhaler Take 2 Puffs by inhalation every four (4) hours as needed for Wheezing or Shortness of Breath. Take / use AM day of surgery  per anesthesia protocols if needed. Indications: Acute Asthma Attack, Chronic Obstructive Pulmonary Disease   Yes Historical Provider   fluticasone-vilanterol (BREO ELLIPTA) 200-25 mcg/dose inhaler Take 1 Puff by inhalation daily. RINSE MOUTH WELL AFTER USE 7/5/18   Miguel Green NP   denosumab (PROLIA) 60 mg/mL injection 1 mL by SubCUTAneous route once for 1 dose. Every 6 months, last shot 6/9/16        May draw labs if needed     Original order is good for 6 mths  Indications: POST-MENOPAUSAL OSTEOPOROSIS  Patient taking differently: 60 mg by SubCUTAneous route once.  Every 6 months, last shot 04/2018      May draw labs if needed     Original order is good for 6 mths  Indications: POST-MENOPAUSAL OSTEOPOROSIS 6/8/17 6/26/18  Mamie Soulier, MD     Allergies   Allergen Reactions    Codeine Itching    Phenobarbital Unknown (comments)    Tramadol Itching        Review of Systems: +right knee pain, +antalgic gait. Denies chest pain, shortness of breath, cough, headache, visual problems, abdominal pain, dysurea, calf pain. Pertinent positives are as noted in the medical records and unremarkable otherwise. Objective:     Vitals:  Blood pressure 146/73, pulse 61, temperature 97.3 °F (36.3 °C), resp. rate 16, SpO2 97 %. Temp (24hrs), Av °F (36.7 °C), Min:97.3 °F (36.3 °C), Max:99 °F (37.2 °C)        Intake and Output:       Physical Exam:   General: Alert and age appropriately oriented. No acute cardio respiratory distress. HEENT: Normocephalic, no conjunctival pallor, no scleral icterus  Oral mucosa moist without cyanosis, no JVD   Lungs: Clear to auscultation bilaterally. Respiration even and unlabored   Heart: Regular rate and rhythm, S1, S2   No  murmurs, clicks, rub or gallops   Abdomen: Soft, non-tender, non-distended. Genitourinary: defered   Neuromuscular:      Grossly no focal motor deficits. Right knee extension strong  Right ankle dorsiflexion 5/5  Right ankle plantarflexion 5/5  No sensory deficits distally BLE to soft touch. Skin/extremity: No calf tenderness BLE. No rashes, no marginal erythema.                                                                                          Labs/Studies:  Recent Results (from the past 72 hour(s))   TYPE & SCREEN    Collection Time: 18  7:52 AM   Result Value Ref Range    Crossmatch Expiration 2018     ABO/Rh(D) A POSITIVE     Antibody screen NEG    GLUCOSE, POC    Collection Time: 18  7:56 AM   Result Value Ref Range    Glucose (POC) 83 65 - 100 mg/dL   URINALYSIS W/ RFLX MICROSCOPIC    Collection Time: 18 10:15 AM   Result Value Ref Range    Color YELLOW      Appearance CLEAR      Specific gravity 1.009 1.001 - 1.023      pH (UA) 7.5 5.0 - 9.0      Protein NEGATIVE  NEG mg/dL    Glucose NEGATIVE  mg/dL    Ketone TRACE (A) NEG mg/dL Bilirubin NEGATIVE  NEG      Blood NEGATIVE  NEG      Urobilinogen 0.2 0.2 - 1.0 EU/dL    Nitrites NEGATIVE  NEG      Leukocyte Esterase NEGATIVE  NEG         Functional Assessment:  Reviewed participation and progress in therapies  Gross Assessment  AROM: Generally decreased, functional (left LE) (07/09/18 1500)  Strength: Generally decreased, functional (left LE) (07/09/18 1500)  Coordination: Generally decreased, functional (left LE) (07/09/18 1500)   Bed Mobility  Supine to Sit: Contact guard assistance (07/09/18 1500)  Sit to Supine:  (NT) (07/09/18 1500)  Scooting: Contact guard assistance (07/09/18 1500)   Balance  Sitting: Intact (07/09/18 1604)  Standing: Pull to stand; With support (07/09/18 1604)               Bed/Mat Mobility  Supine to Sit: Contact guard assistance (07/09/18 1500)  Sit to Supine:  (NT) (07/09/18 1500)  Sit to Stand: Minimum assistance (07/09/18 1500)  Bed to Chair: Minimum assistance (with walker) (07/09/18 1500)  Scooting: Contact guard assistance (07/09/18 1500)     Ambulation:  Gait  Speed/Alissa: Shuffled; Slow (07/09/18 1500)  Step Length: Left shortened;Right shortened (07/09/18 1500)  Stance: Right decreased (07/09/18 1500)  Gait Abnormalities: Antalgic;Decreased step clearance (07/09/18 1500)  Ambulation - Level of Assistance: Minimal assistance (07/09/18 1500)  Distance (ft): 10 Feet (ft) (07/09/18 1500)    Impression/Plan:     Active Problems:    HTN (hypertension) (8/21/2013)      Congestive heart failure (HCC) (3/14/2017)      OA (osteoarthritis) of knee (7/9/2018)        Current Facility-Administered Medications   Medication Dose Route Frequency Provider Last Rate Last Dose    tuberculin injection 5 Units  5 Units IntraDERMal Dolly Scott MD   5 Units at 07/09/18 0753    atorvastatin (LIPITOR) tablet 20 mg  20 mg Oral QHS Leticia Maki MD        calcium carbonate (OS-EMERITA) tablet 500 mg [elemental]  500 mg Oral BID MD [place] ON 7/10/2018] furosemide (LASIX) tablet 20 mg  20 mg Oral DAILY Constantine Dietrich MD        [START ON 7/10/2018] levothyroxine (SYNTHROID) tablet 50 mcg  50 mcg Oral ACB Constantine Dietrich MD        [START ON 7/10/2018] lisinopril (PRINIVIL, ZESTRIL) tablet 10 mg  10 mg Oral DAILY Constantine Dietrich MD        LORazepam (ATIVAN) tablet 0.5 mg  0.5 mg Oral PCD Constantine Dietrich MD        metoprolol tartrate (LOPRESSOR) tablet 25 mg  25 mg Oral BID Constantine Dietrich MD        montelukast (SINGULAIR) tablet 10 mg  10 mg Oral QHS Constantine Dietrich MD        theophylline ER(12 hour) (THEOCHRON) tablet 300 mg  300 mg Oral BID Constantine Dietrich MD        0.9% sodium chloride infusion  100 mL/hr IntraVENous CONTINUOUS Constantine Dietrich MD        sodium chloride (NS) flush 5-10 mL  5-10 mL IntraVENous PRN Constantine Dietrich MD        acetaminophen (OFIRMEV) infusion 1,000 mg  1,000 mg IntraVENous Shelley Sage MD        [START ON 7/10/2018] acetaminophen (TYLENOL) tablet 1,000 mg  1,000 mg Oral Q6H Constantine Dietrich MD        naloxone [facility]) injection 0.2-0.4 mg  0.2-0.4 mg IntraVENous Q10MIN PRN Constantine Dietrich MD        [START ON 7/10/2018] dexamethasone (DECADRON) injection 10 mg  10 mg IntraVENous ONCE Constantine Dietrich MD        diphenhydrAMINE (BENADRYL) capsule 25 mg  25 mg Oral Q4H PRN Constantine Dietrich MD        aspirin delayed-release tablet 81 mg  81 mg Oral Q12H Constantine Dietrich MD        ceFAZolin (ANCEF) 2 g/20 mL in sterile water IV syringe  2 g IntraVENous Q8H Constantine Dietrich MD        HYDROmorphone (PF) (DILAUDID) injection 1 mg  1 mg IntraVENous Q3H PRN Constantine Dietrich MD        [START ON 7/10/2018] senna-docusate (PERICOLACE) 8.6-50 mg per tablet 2 Tab  2 Tab Oral DAILY Constantine Dietrich MD        ondansetron (ZOFRAN ODT) tablet 8 mg  8 mg Oral Q8H PRN Constantine Dietrich MD        tranexamic acid (LYSTEDA) tablet 1,300 mg  1,300 mg Oral Q3H Constantine Dietrich MD        HYDROcodone-acetaminophen (NORCO) 7.5-325 mg per tablet 1 Tab  1 Tab Oral Q4H PRN Mike Miller MD        albuterol (PROVENTIL VENTOLIN) nebulizer solution 2.5 mg  2.5 mg Nebulization Q4HWA RT Mike Miller MD   2.5 mg at 07/09/18 1542    [START ON 7/10/2018] magnesium oxide (MAG-OX) tablet 400 mg  400 mg Oral DAILY Mike Miller MD        [START ON 7/10/2018] fluticasone-vilanterol (BREO ELLIPTA) 200mcg-25mcg/puff (Patient Supplied)  1 Puff Inhalation DAILY Mike Miller MD       [address] [place], [place] [address]s ON 7/10/2018] cholecalciferol (VITAMIN D3) tablet 1,000 Units  1,000 Units Oral DAILY Mike Miller MD            Recommendations: Recommend sub acute rehab at [facility]. Continue Acute Rehab Program.     Rehabilitation Management/ Medical Management: 1. Devices:Walkers, Type: Rolling Walker  2. Consult:Rehab team including PT, OT,  and . 3. Disposition Rehab-discussed with patient. 4. Thigh-high or knee-high YOLANDE's when out of bed. 5. DVT Prophylaxis - aspirin 81mg bid x 30days. 6. Incentive spirometer Q1H while awake  7. Post op hemorrhagic anemia- monitor. 8. Activity: WBAT RLE  9. Planned Labs: CBC,BMP  10. Pain Control:  Continue with scheduled tylenol, celebrex and  PRN meds. Continue current management. 11. Wound Care: Keep right TKA wound clean and dry and reinforce dressing PRN. May remove Aquacel 1 week post op ad replace with new one. Remove staples 12-14 post surgery, when incision appears appropriately closed and apply benzoin and 1/2\" steristrips. Follow up with ORTHO per instructions. Thank you for the opportunity to participate in the care of this patient.     Signed By: Madelaine Chavez MD     July 9, 2018

## 2018-07-09 NOTE — ANESTHESIA PROCEDURE NOTES
Spinal Block    Start time: 7/9/2018 10:11 AM  End time: 7/9/2018 10:13 AM  Performed by: Ayana Vidal  Authorized by: Ayana Vidal     Pre-procedure:   Indications: primary anesthetic  Preanesthetic Checklist: patient identified, risks and benefits discussed, anesthesia consent, site marked, patient being monitored and timeout performed    Timeout Time: 10:11          Spinal Block:   Patient Position:  Seated    Prep: chlorhexidine and patient draped      Location:  L2-3  Technique:  Single shot        Needle:   Needle Type:  Pencan  Needle Gauge:  25 G  Attempts:  1      Events: CSF confirmed, no blood with aspiration and no paresthesia        Assessment:  Insertion:  Uncomplicated  Patient tolerance:  Patient tolerated the procedure well with no immediate complications  9 mgs hyperbaric bupivacaine

## 2018-07-09 NOTE — ANESTHESIA PROCEDURE NOTES
Peripheral Block    Start time: 7/9/2018 9:18 AM  End time: 7/9/2018 9:21 AM  Performed by: Elissa Lee  Authorized by: Elissa Lee       Pre-procedure: Indications: at surgeon's request and post-op pain management    Preanesthetic Checklist: patient identified, risks and benefits discussed, site marked, timeout performed, anesthesia consent given and patient being monitored    Timeout Time: 09:18          Block Type:   Block Type:   Adductor canal  Laterality:  Right  Monitoring:  Standard ASA monitoring, continuous pulse ox, frequent vital sign checks, heart rate, oxygen and responsive to questions  Injection Technique:  Single shot  Procedures: ultrasound guided    Patient Position: supine  Prep: chlorhexidine    Location:  Mid thigh  Needle Type:  Stimuplex  Needle Gauge:  21 G  Needle Localization:  Anatomical landmarks and ultrasound guidance  Add'l Medication Injected:  0.2%  ropivacaine  Volume (mL):  20    Assessment:  Number of attempts:  1  Injection Assessment:  Incremental injection every 5 mL, local visualized surrounding nerve on ultrasound, negative aspiration for blood, no intravascular symptoms, no paresthesia and ultrasound image on chart  Patient tolerance:  Patient tolerated the procedure well with no immediate complications

## 2018-07-09 NOTE — ANESTHESIA PREPROCEDURE EVALUATION
Anesthetic History   No history of anesthetic complications            Review of Systems / Medical History  Patient summary reviewed and pertinent labs reviewed    Pulmonary    COPD: moderate        Asthma     Comments: Home 02 at night   Neuro/Psych   Within defined limits           Cardiovascular    Hypertension: well controlled        Dysrhythmias : atrial fibrillation      Exercise tolerance: <4 METS  Comments: Hx of A Fib   GI/Hepatic/Renal     GERD    Renal disease: CRI       Endo/Other      Hypothyroidism: well controlled  Arthritis     Other Findings              Physical Exam    Airway  Mallampati: II  TM Distance: > 6 cm  Neck ROM: decreased range of motion   Mouth opening: Normal     Cardiovascular    Rhythm: regular           Dental    Dentition: Full upper dentures and Full lower dentures     Pulmonary                 Abdominal  GI exam deferred       Other Findings            Anesthetic Plan    ASA: 3  Anesthesia type: spinal - femoral single shot      Post-op pain plan if not by surgeon: peripheral nerve block single    Induction: Intravenous  Anesthetic plan and risks discussed with: Patient

## 2018-07-09 NOTE — PERIOP NOTES
TRANSFER - IN REPORT:    Verbal report received from Payton Fay RN(name) on Ernestine Quale  being received from [facility](unit) for routine progression of care      Report consisted of patients Situation, Background, Assessment and   Recommendations(SBAR). Information from the following report(s) SBAR, Kardex and MAR was reviewed with the receiving nurse. Opportunity for questions and clarification was provided.

## 2018-07-09 NOTE — OP NOTES
[address] [place] [address]  Total Knee Arthroplasty  Patient:Nolvia Reveles   : 1925  Medical Record Number:[id number]    Pre-operative Diagnosis:  Osteoarthritis of right knee, unspecified osteoarthritis type [M17.11]    Post-operative Diagnosis: Osteoarthritis of right knee, unspecified osteoarthritis type     Location: [place] [address]    Date of Procedure: 2018    Surgeon: Princess Leblanc MD    Assistant: Jessi De Leon CFA    Anesthesia: Spinal and adductor nerve block    Procedure:  Right Total Knee Arthroplasty with Bone Cement    Tourniquet Time: 0 minutes    EBL: [id number]     Complications: None    Patient Condition upon Completion of Procedure: Stable    Implants:   Implant Name Type Inv. Item Serial No.  Lot No. LRB No. Used   CEMENT BNE HV R 40GM -- PALACOS R [id number] - [id number]  CEMENT BNE HV R 40GM -- PALACOS R [id number] [facility] [id number] Right 1   CEMENT BNE HV R 40GM -- PALACOS R [id number] - [id number]  CEMENT BNE HV R 40GM -- PALACOS R [id number] [facility] [id number] Right 1   FEM KNE BRANDON CR SZ 4 RT -- TRIATHLON - [id number]  FEM KNE BRANDON CR SZ 4 RT -- TRIATHLON BY23L [employer] HOW [id number] Right 1   INSERT TIB ARTC BRNG SZ4 11MM -- TRIATHLON - [id number]  INSERT TIB ARTC BRNG SZ4 11MM -- TRIATHLON [id number] [employer] HOW [id number] Right 1   BASEPLT TIB UNIV TRIATHLN 4 --  - [id number]  BASEPLT TIB UNIV TRIATHLN 4 --  [id number] [employer] HOW [id number] Right 1   COMPNT PAT ASYM TRIATHLN 32X10 --  - [id number]  COMPNT PAT ASYM TRIATHLN 32X10 --  [id number] [employer] HOW [id number] Right 1   INSERT TIB ARTC BRNG SZ4 13MM -- TRIATHLON - [id number]   INSERT TIB ARTC BRNG SZ4 13MM -- TRIATHLON [id number] [employer] HOW [id number] Right 1       Intra-Operative Findings: Pre-operatively we assess the motion of the patients knee and noted that the knee lacked approximately 5 degrees of extension.   Intra-operatively we noted that the articular surfaces were arthritic with cartilage loss of both the medial and lateral compartments. The patella was examined and found to be in  condition. The patella was  resurfaced. With trial components in place we assessed knee motion and found that the knee was able to fully extend. Flexion was felt to be 130 degrees. Description of Procedure:    Ned Head had undergone adductor canal block in the preoperative holding area. Ned Head was brought to the operating room, positioned on the operating room table, and after appropriate identification underwent spinal anesthesia. A manrique catheter was then placed. IV antibiotics were administered per proticol. A pneumatic tourniquet was placed about the limb. The right leg and was then prepped and draped in the usual sterile manner. Timeout was taken identifying the patient, procedure ,operative side and surgeon. Prior to incision one gram of IV transexamic acid was administered intravenously. An anterior longitudinal incision was made just medial to the tibial tubercle and extending proximal.  A subvastas capsular incision was performed. The medial capsular flap was elevated around to the midcoronal plane. The patella was subluxed laterally and patellar fat pat partially excised. The knee was flexed and externally rotated. The articular surface revealed cartilage loss with exposed bone and bone spurs throughout all three compartments. The anterior cruciate ligament was resected. We first addressed our distal femoral resection. Using intramedullary instrumentation we resected 8mm of distal femur using a 5 degree valgus cut angle. Medial and lateral condylar cuts were verified to be level using the capture of the distal femoral cutting jig. We next addressed our proximal tibia. Using extramedullary intrstrumentation we resected 2mm of bone as measured from the more involved compartment.  Our cut was verified to be perpendicular to the meachanical axis of the tibia as referenced from the tibial tubercle, the long axis of the tibia, the center of the ankle, and the patients 2nd toe. Our slope was cut with the goal of 0-3 degrees posterior slope. At this point a spacer block was used to verify that the knee was able to obtain full extension and was balanced in extension. We did not have to resect additional bone to achieve this goal. Once the knee was felt to fully extend and showed good balance in extension the distal femoral pins were removed and we moved on to finishing our distal femoral preparation. Prior to sizing our distal femur we marked Cross's Line and our transepicondylar axis. Using sizing instrumentation the femur was sized to a #4 component. The anterior and posterior cuts along with the anterior and posterior chamfer cuts were then made using the 4-in-1 cutting block. Osteophytes were removed from the tibial and femoral surfaces. The PCL was assessed and felt to be in good condition and be very stable. Medial and lateral meniscus' were removed along with any redundant tissue. Any posterior osteophytes were removed. We then returned our attention to the tibial side. The tibia was then sized to a #4 component. The tibial component was assessed in terms of position and rotation. Once we felt that we had obtained symmetric coverage of the proximal tibia and had rotated the tibial tray to a point where the midline of the component was bisecting the middle and medial 3rd of the tibial tubercle we marked the proximal tibia with bovie cautery to kathya rotation and also pinned the tibial tray into place. We then performed a trial of the knee with trial components in place. We felt that with a 11 mm polyethylene trial in place the knee had acceptable varus and valgus stability throughout arc of motion, was able to fully extend, was not too tight in flexion, and had acceptable stability with anterior  Drawer testing.  No additional surgical releases were required. Again we assessed our PCL and felt it was stable and in good condition. The patella was then everted and examined. The patella was felt to be in poor  condition and the decision was made to  resurface the patella. Bone was resected to accomodate a size 32mm patella button. A trial reduction revealed appropriate tracking through the patellofemoral groove with no lateral retinacular release required. Again patellar tracking was assessed and it was felt that the patella was tracking appropriately within the femoral trochlear groove. At this point the trial polyethylene component and trial femoral component were removed. We again assessed our tibial tray and verified that we were satisfied with its position. We did not have to adjust its position. The proximal tibia was drilled and punched. The trial tibial tray was removed and the surfaces were prepared for cementing with irrigation and debridement of the bone interstices. Two packages of cement were vacuum mixed on the back table and the permanent components cemented into place. A trial polyethylene component was inserted temporarily until cement had cured. The patella component was pressurized using the patella clamp. Excess cement was removed using a curette. Once the cement was hardened, the patella clamp was removed and the knee was copiously irrigated. We then inserted our final polyethylene component which was 11 mm thick. Nolvia Vila's knee was placed through a range of motion and we felt patient had excessive anterior drawer testing and some increase varus/valgus laxity noted than was previosuly appreciated. We opted to switch out the 11mm poly for a 13mm poly which helped in correcting some of this laxity. A lavage of diluted betadine solution of  17.5 ml Betadine in 500 of 0.9% normal saline was allowed to soak in the wound for 3 minutes after implanting of the prosthesis.  The wound was irrigated with normal saline again before closure. We then carefully injected periarticular cocktail about and capsule and subcutaneous tissue. In addition, one additional gram of transexemic acid was given at the end of the case for a total dose of 2 grams. No drain was placed. The capsular layer was closed using a combination of 0-Stratafix bidirectional barbed suture and #2 Fiberwire. After wound closure 2 grams of topical  transexemic acid was injected into the capsule. The subcutaneous layer was closed using a 2-0 Vicryl interrupted suture. The skin was closed using staples. A sterile dressing was applied. A cryo pad was applied on the operative leg. The sponge count and needle counts were correct. Patient was transferred to the hospital stretcher and transported to recovery in stable condition.     Signed By: Emil Cardenas MD

## 2018-07-09 NOTE — PROGRESS NOTES
Joint Camp Notes Reviewed. Pt working on IS. Pt encouraged to do 10 breaths per hour while awake on IS. Good NPC. No respiratory distress noted at this time. No complications noted at this time. Monitor number 12 placed in room

## 2018-07-09 NOTE — PROGRESS NOTES
Problem: Self Care Deficits Care Plan (Adult)  Goal: *Acute Goals and Plan of Care (Insert Text)  GOALS:   DISCHARGE GOALS (in preparation for going home/rehab):  7 days may continue goals at rehab facility   1. Ms. Regino Porter will perform one lower body dressing activity with minimal assistance required to demonstrate improved functional mobility and safety. 2.  Ms. Regino Porter will perform one lower body bathing activity with minimal assistance required to demonstrate improved functional mobility and safety. 3.  Ms. Regino Porter will perform toileting/toilet transfer with contact guard assistance to demonstrate improved functional mobility and safety. 4.  Ms. Regino Porter will perform shower transfer with contact guard assistance to demonstrate improved functional mobility and safety. JOINT CAMP OCCUPATIONAL THERAPY TKA: Initial Assessment 7/9/2018  INPATIENT: Hospital Day: 1  [place] / [place] / Product Type: Medicare /      NAME/AGE/GENDER: Ned Head is a 80 y.o. female   PRIMARY DIAGNOSIS:  Osteoarthritis of right knee, unspecified osteoarthritis type [M17.11]   Procedure(s) and Anesthesia Type:     * RIGHT KNEE ARTHROPLASTY TOTAL SOUMYA CEMENTED / ANCEF 2gm / Deetta Pulse - Spinal (Right)  ICD-10: Treatment Diagnosis:    · Pain in Right Knee (M25.561)  · Stiffness of Right Knee, Not elsewhere classified (X18.238)      ASSESSMENT:     Ms. Regino Porter is s/p right TKA and presents with decreased weight bearing on right LE and decreased independence with functional mobility and activities of daily living as compared to baseline level of function and safety. Patient would benefit from skilled Occupational Therapy to maximize independence and safety with self-care task and functional mobility.   Pt would also benefit from education on adaptive equipment and safety precautions in preparation for going home or for recommendations for post-hospital rehab program.  Patient plans for further rehab at rehab facility. OT reviewed therapy schedule and plan of care with patient. Patient was able to transfer and preform self care skills as charted below. Patient instructed to call for assistance when needing to get up from the bed and all needs in reach. Patient verbalized understanding of call light. This section established at most recent assessment   PROBLEM LIST (Impairments causing functional limitations):  1. Decreased Strength  2. Decreased ADL/Functional Activities  3. Decreased Transfer Abilities  4. Increased Pain  5. Increased Fatigue  6. Decreased Flexibility/Joint Mobility  7. Decreased Knowledge of Precautions   INTERVENTIONS PLANNED: (Benefits and precautions of occupational therapy have been discussed with the patient.)  1. Activities of daily living training  2. Adaptive equipment training  3. Balance training  4. Clothing management  5. Donning&doffing training  6. Theraputic activity     TREATMENT PLAN: Frequency/Duration: Follow patient 1-2 times to address above goals. Rehabilitation Potential For Stated Goals: Good     RECOMMENDED REHABILITATION/EQUIPMENT: (at time of discharge pending progress): Continue Skilled Therapy and Home Health: Physical Therapy. OCCUPATIONAL PROFILE AND HISTORY:   History of Present Injury/Illness (Reason for Referral): Pt presents this date s/p (right) TKA. Past Medical History/Comorbidities:   Ms. Kelly Hazel  has a past medical history of Arthritis; Asthma; Breast CA ([address] Cardiac arrhythmia (03/2013); Chronic obstructive pulmonary disease ([address] Chronic pain; Former smoker; GERD (gastroesophageal reflux disease); Heart failure ([address] Heart murmur; High cholesterol; Hypertension; Incontinence in female; Migraine; Mild aortic stenosis; Oxygen dependent; and Thyroid disease. She also has no past medical history of Adverse effect of anesthesia; Aneurysm ([address] Autoimmune disease ([address] CAD (coronary artery disease);  Chronic kidney disease; Coagulation disorder ([facility] Diabetes ([facility] Difficult intubation; Endocarditis; Ill-defined condition; Liver disease; Malignant hyperthermia due to anesthesia; Morbid obesity ([facility] Nausea & vomiting; Nicotine vapor product user; Non-nicotine vapor product user; Pseudocholinesterase deficiency; Psychiatric disorder; PUD (peptic ulcer disease); Rheumatic fever; Seizures ([facility] Sleep apnea; Stroke [facility]); or Thromboembolus ([facility] Ms. Regino Porter  has a past surgical history that includes hx hysterectomy; hx appendectomy; hx splenectomy (1998); hx cholecystectomy; hx breast lumpectomy (Right, 10/31/2012); hx open reduction internal fixation (Right, 05/31/2018); hx other surgical; and hx knee arthroscopy (Right). Social History/Living Environment:   Home Environment: Private residence  # Steps to Enter: 1  Rails to Enter: No  One/Two Story Residence: One story  Living Alone: No  Support Systems: Family member(s)  Patient Expects to be Discharged to[de-identified] Skilled nursing facility  Current DME Used/Available at Home: None  Tub or Shower Type: Tub/Shower combination  Prior Level of Function/Work/Activity:  Using a wheelchair for most mobility and minimal assistance with self care     Number of Personal Factors/Comorbidities that affect the Plan of Care: Brief history (0):  LOW COMPLEXITY   ASSESSMENT OF OCCUPATIONAL PERFORMANCE[de-identified]   Most Recent Physical Functioning:   Balance  Sitting: Intact  Standing: Pull to stand; With support       Gross Assessment: Yes  Gross Assessment  AROM: Generally decreased, functional (left LE)  Strength: Generally decreased, functional (left LE)  Coordination: Generally decreased, functional (left LE)          LLE Assessment  LLE Assessment (WDL): Within defined limits Coordination  Fine Motor Skills-Upper: Left Intact; Right Intact  Gross Motor Skills-Upper: Left Intact; Right Intact         Mental Status  Neurologic State: Alert  Orientation Level: Oriented X4  Cognition: Appropriate for age attention/concentration  Perception: Appears intact  Perseveration: No perseveration noted  Safety/Judgement: Awareness of environment; Fall prevention          RLE PROM  R Knee Flexion: 60 (~post op)  R Knee Extension: -15 (~post op)     Basic ADLs (From Assessment) Complex ADLs (From Assessment)   Basic ADL  Feeding: Independent  Oral Facial Hygiene/Grooming: Setup  Bathing: Moderate assistance  Upper Body Dressing: Setup  Lower Body Dressing: Maximum assistance  Toileting: Maximum assistance     Grooming/Bathing/Dressing Activities of Daily Living     Cognitive Retraining  Safety/Judgement: Awareness of environment; Fall prevention                 Functional Transfers  Toilet Transfer : Moderate assistance  Shower Transfer: Moderate assistance     Bed/Mat Mobility  Supine to Sit: Contact guard assistance  Sit to Supine:  (NT)  Sit to Stand: Minimum assistance  Bed to Chair: Minimum assistance (with walker)  Scooting: Contact guard assistance         Physical Skills Involved:  1. Range of Motion  2. Balance  3. Strength Cognitive Skills Affected (resulting in the inability to perform in a timely and safe manner): 1. none Psychosocial Skills Affected:  1. Environmental Adaptation   Number of elements that affect the Plan of Care: 3-5:  MODERATE COMPLEXITY   CLINICAL DECISION MAKIN [place] [address] AM-PAC 6 Clicks   Daily Activity Inpatient Short Form  How much help from another person does the patient currently need. .. Total A Lot A Little None   1. Putting on and taking off regular lower body clothing? [] 1   [x] 2   [] 3   [] 4   2. Bathing (including washing, rinsing, drying)? [] 1   [x] 2   [] 3   [] 4   3. Toileting, which includes using toilet, bedpan or urinal?   [] 1   [x] 2   [] 3   [] 4   4. Putting on and taking off regular upper body clothing? [] 1   [] 2   [] 3   [x] 4   5. Taking care of personal grooming such as brushing teeth? [] 1   [] 2   [] 3   [x] 4   6. Eating meals?    [] 1 [] 2   [] 3   [x] 4   © 2007, Trustees of [facility] [employer], under license to [employer]. All rights reserved     Score:  Initial:18 Most Recent: X (Date: -- )    Interpretation of Tool:  Represents activities that are increasingly more difficult (i.e. Bed mobility, Transfers, Gait). Score 24 23 22-20 19-15 14-10 9-7 6     Modifier CH CI CJ CK CL CM CN      ? Self Care:     - CURRENT STATUS: CK - 40%-59% impaired, limited or restricted    - GOAL STATUS: CJ - 20%-39% impaired, limited or restricted    - D/C STATUS:  ---------------To be determined---------------  [place] / Product Type: Medicare /      Medical Necessity:     · Patient is expected to demonstrate progress in range of motion, balance and functional technique to increase independence with self care. Reason for Services/Other Comments:  · Patient would benefit from skilled Occupational Therapy to maximize independence and safety with self-care task and functional mobility. .   Use of outcome tool(s) and clinical judgement create a POC that gives a: LOW COMPLEXITY            TREATMENT:   (In addition to Assessment/Re-Assessment sessions the following treatments were rendered)     Pre-treatment Symptoms/Complaints:  Pt without complaint of pain  Pain: Initial:   Pain Intensity 1: 0 0 Post Session:  0     Assessment/Reassessment only, no treatment provided today    Treatment/Session Assessment:     Response to Treatment:  Pt up to edge of bed and chair tolerated well.     Education:  [] Home Exercises  [x] Fall Precautions  [] Hip Precautions [] Going Home Video  [x] Knee/Hip Prosthesis Review  [x] Walker Management/Safety [x] Adaptive Equipment as Needed       Interdisciplinary Collaboration:   o Physical Therapist  o Occupational Therapist  o Registered Nurse    After treatment position/precautions:   o Up in chair  o Bed/Chair-wheels locked  o Call light within reach  o RN notified  o Family at bedside     Compliance with Program/Exercises: compliant all of the time. Recommendations/Intent for next treatment session:  Treatment next visit will focus on increasing Ms. Vila's independence with bed mobility, transfers, self care, functional mobility, modalities for pain, and patient education.       Total Treatment Duration:  OT Patient Time In/Time Out  Time In: 1455  Time Out: Caleb Pierre 79, OT

## 2018-07-10 LAB
ANION GAP SERPL CALC-SCNC: 11 MMOL/L (ref 7–16)
BUN SERPL-MCNC: 18 MG/DL (ref 8–23)
CALCIUM SERPL-MCNC: 8.3 MG/DL (ref 8.3–10.4)
CHLORIDE SERPL-SCNC: 104 MMOL/L (ref 98–107)
CO2 SERPL-SCNC: 21 MMOL/L (ref 21–32)
CREAT SERPL-MCNC: 1.17 MG/DL (ref 0.6–1)
GLUCOSE SERPL-MCNC: 88 MG/DL (ref 65–100)
HGB BLD-MCNC: 10 G/DL (ref 11.7–15.4)
POTASSIUM SERPL-SCNC: 5.1 MMOL/L (ref 3.5–5.1)
SODIUM SERPL-SCNC: 136 MMOL/L (ref 136–145)

## 2018-07-10 PROCEDURE — 65270000029 HC RM PRIVATE

## 2018-07-10 PROCEDURE — 74011250637 HC RX REV CODE- 250/637: Performed by: ORTHOPAEDIC SURGERY

## 2018-07-10 PROCEDURE — 36415 COLL VENOUS BLD VENIPUNCTURE: CPT | Performed by: ORTHOPAEDIC SURGERY

## 2018-07-10 PROCEDURE — 74011250636 HC RX REV CODE- 250/636: Performed by: ORTHOPAEDIC SURGERY

## 2018-07-10 PROCEDURE — 80048 BASIC METABOLIC PNL TOTAL CA: CPT | Performed by: ORTHOPAEDIC SURGERY

## 2018-07-10 PROCEDURE — 74011000250 HC RX REV CODE- 250: Performed by: ORTHOPAEDIC SURGERY

## 2018-07-10 PROCEDURE — 94640 AIRWAY INHALATION TREATMENT: CPT

## 2018-07-10 PROCEDURE — 77030032490 HC SLV COMPR SCD KNE COVD -B

## 2018-07-10 PROCEDURE — 85018 HEMOGLOBIN: CPT | Performed by: ORTHOPAEDIC SURGERY

## 2018-07-10 PROCEDURE — 97535 SELF CARE MNGMENT TRAINING: CPT

## 2018-07-10 PROCEDURE — 97116 GAIT TRAINING THERAPY: CPT

## 2018-07-10 PROCEDURE — 97150 GROUP THERAPEUTIC PROCEDURES: CPT

## 2018-07-10 PROCEDURE — 94760 N-INVAS EAR/PLS OXIMETRY 1: CPT

## 2018-07-10 PROCEDURE — 97110 THERAPEUTIC EXERCISES: CPT

## 2018-07-10 PROCEDURE — 94762 N-INVAS EAR/PLS OXIMTRY CONT: CPT

## 2018-07-10 RX ORDER — PHENOL/SODIUM PHENOLATE
20 AEROSOL, SPRAY (ML) MUCOUS MEMBRANE DAILY
Status: DISCONTINUED | OUTPATIENT
Start: 2018-07-11 | End: 2018-07-12 | Stop reason: HOSPADM

## 2018-07-10 RX ADMIN — ALBUTEROL SULFATE 2.5 MG: 2.5 SOLUTION RESPIRATORY (INHALATION) at 16:24

## 2018-07-10 RX ADMIN — LORAZEPAM 0.5 MG: 1 TABLET ORAL at 16:51

## 2018-07-10 RX ADMIN — VITAMIN D, TAB 1000IU (100/BT) 1000 UNITS: 25 TAB at 09:11

## 2018-07-10 RX ADMIN — THEOPHYLLINE 300 MG: 300 TABLET, EXTENDED RELEASE ORAL at 20:05

## 2018-07-10 RX ADMIN — Medication 500 MG: at 16:52

## 2018-07-10 RX ADMIN — FUROSEMIDE 20 MG: 20 TABLET ORAL at 09:11

## 2018-07-10 RX ADMIN — THEOPHYLLINE 300 MG: 300 TABLET, EXTENDED RELEASE ORAL at 09:11

## 2018-07-10 RX ADMIN — MONTELUKAST SODIUM 10 MG: 10 TABLET, FILM COATED ORAL at 20:05

## 2018-07-10 RX ADMIN — ALBUTEROL SULFATE 2.5 MG: 2.5 SOLUTION RESPIRATORY (INHALATION) at 11:29

## 2018-07-10 RX ADMIN — HYDROCODONE BITARTRATE AND ACETAMINOPHEN 1 TABLET: 7.5; 325 TABLET ORAL at 03:19

## 2018-07-10 RX ADMIN — HYDROCODONE BITARTRATE AND ACETAMINOPHEN 1 TABLET: 7.5; 325 TABLET ORAL at 22:09

## 2018-07-10 RX ADMIN — HYDROCODONE BITARTRATE AND ACETAMINOPHEN 1 TABLET: 7.5; 325 TABLET ORAL at 09:11

## 2018-07-10 RX ADMIN — SENNOSIDES AND DOCUSATE SODIUM 2 TABLET: 8.6; 5 TABLET ORAL at 09:11

## 2018-07-10 RX ADMIN — ASPIRIN 81 MG: 81 TABLET, COATED ORAL at 20:06

## 2018-07-10 RX ADMIN — ONDANSETRON 8 MG: 8 TABLET, ORALLY DISINTEGRATING ORAL at 16:53

## 2018-07-10 RX ADMIN — ALBUTEROL SULFATE 2.5 MG: 2.5 SOLUTION RESPIRATORY (INHALATION) at 20:08

## 2018-07-10 RX ADMIN — Medication 400 MG: at 09:12

## 2018-07-10 RX ADMIN — Medication 500 MG: at 09:11

## 2018-07-10 RX ADMIN — LEVOTHYROXINE SODIUM 50 MCG: 50 TABLET ORAL at 05:55

## 2018-07-10 RX ADMIN — Medication 2 G: at 03:13

## 2018-07-10 RX ADMIN — ALBUTEROL SULFATE 2.5 MG: 2.5 SOLUTION RESPIRATORY (INHALATION) at 08:30

## 2018-07-10 RX ADMIN — HYDROCODONE BITARTRATE AND ACETAMINOPHEN 1 TABLET: 7.5; 325 TABLET ORAL at 12:30

## 2018-07-10 RX ADMIN — METOPROLOL TARTRATE 25 MG: 25 TABLET ORAL at 09:11

## 2018-07-10 RX ADMIN — HYDROCODONE BITARTRATE AND ACETAMINOPHEN 1 TABLET: 7.5; 325 TABLET ORAL at 16:51

## 2018-07-10 RX ADMIN — METOPROLOL TARTRATE 25 MG: 25 TABLET ORAL at 20:06

## 2018-07-10 RX ADMIN — ASPIRIN 81 MG: 81 TABLET, COATED ORAL at 09:11

## 2018-07-10 RX ADMIN — ATORVASTATIN CALCIUM 20 MG: 10 TABLET, FILM COATED ORAL at 20:05

## 2018-07-10 NOTE — PROGRESS NOTES
Care Management Interventions  Mode of Transport at Discharge: Self  Transition of Care Consult (CM Consult): SNF  Partner SNF: Yes  Physical Therapy Consult: Yes  Occupational Therapy Consult: Yes  Current Support Network: Lives Alone  Confirm Follow Up Transport: Family  Plan discussed with Pt/Family/Caregiver: Yes  Freedom of Choice Offered: Yes  Discharge Location  Discharge Placement: Skilled nursing facility    Patient is a 80 yr old female admitted for a right tKA. She lives alone in [place]. I met with patient and her granddtr Osborne Oxford to discuss d/c plans. Patient asking for rehab  At [facility] in [place] on d/c. Isabel Brennan with the NH confirmed they should have a bed. Referral sent for their review. We hope to receive approval for admission on Thurs 7-12  After her three night In stay. Will follow.  Joe Christine

## 2018-07-10 NOTE — PHYSICIAN ADVISORY
Letter of Determination: Inpatient Status Appropriate    This patient was originally hospitalized as Inpatient Status on 7/9/2018 for scheduled right total knee arthroplasty. This patient is appropriate for Inpatient Admission in accordance with CMS regulation Section 43 .3. Specifically, patient's stay is expected to be more than Two Midnights and was medically necessary. The patient's stay was medically necessary based on age > 72 with extreme advanced age, asthma, atrial fibrillation, and chronic obstructive pulmonary disease. Consistent with CMS guidelines, patient meets for inpatient status. It is our recommendation that this patient's hospitalization status should be INPATIENT status.      The final decision regarding the patient's hospitalization status depends on the attending physician's judgement.     Muna Anna MD, ROGER,   Physician [place].

## 2018-07-10 NOTE — PROGRESS NOTES
07/10/18 0832   Oxygen Therapy   O2 Sat (%) 99 %   Pulse via Oximetry 75 beats per minute   O2 Device Room air   O2 Flow Rate (L/min) 0 l/min   Pre-Treatment   Breathing Pattern Regular   Cough Non-productive   Breath Sounds Bilateral Clear   Pulse 75   SpO2 99 %   Respirations 16   Post-Treatment   Breathing Pattern Regular   Cough Non-productive   Breath Sounds Bilateral Clear   Pulse 81   SpO2 100 %   Respirations 16   Treatment Tolerance Well   Procedures   $$ Subsequent Procedure Aerosol   Aerosolized Medications Albuterol   Incentive Spirometry Treatment   Actual Volume (ml) 1200 ml   Number of Attempts 2

## 2018-07-10 NOTE — PROGRESS NOTES
Problem: Mobility Impaired (Adult and Pediatric)  Goal: *Acute Goals and Plan of Care (Insert Text)  GOALS (1-4 days):  (1.)Ms. Nury Case will move from supine to sit and sit to supine  in bed with SUPERVISION. (2.)Ms. Nury Case will transfer from bed to chair and chair to bed with STAND BY ASSIST using the least restrictive device. (3.)Ms. Nury Case will ambulate with STAND BY ASSIST for  feet with the least restrictive device. (4.)Ms. Nury Case will ambulate up/down 1 steps with No railing with MINIMAL ASSIST with walker. (5.)Ms. Nury Case will increase right knee ROM to 5°-80°.  ________________________________________________________________________________________________    PHYSICAL THERAPY Joint camp tKa: Daily Note, Treatment Day: 1st, AM 7/10/2018  INPATIENT: Hospital Day: 2  [place] / Product Type: Medicare /      NAME/AGE/GENDER: Cathryn Rojas is a 80 y.o. female   PRIMARY DIAGNOSIS:  Osteoarthritis of right knee, unspecified osteoarthritis type [M17.11]   Procedure(s) and Anesthesia Type:     * RIGHT KNEE ARTHROPLASTY TOTAL SOUMYA CEMENTED / ANCEF 2gm / Deann Elliston - Spinal (Right)  ICD-10: Treatment Diagnosis:    · Pain in Right Knee (M25.561)  · Stiffness of Right Knee, Not elsewhere classified (M25.661)  · Difficulty in walking, Not elsewhere classified (R26.2)      ASSESSMENT:     Ms. Nury Case presents with impaired strength & mobility s/p right TKA. Pt also had decreased stability during out of bed activity. Pt will benefit from follow up therapy to help restore safe function prior to returning home with caregiver. If no caregiver available on DC pt will need a rehab stay at [facility].  7/10/18am-pt up in chair on contact and ready for therapy. Worked on gait training in arthur with verbal cues. Back in room in chair worked on TKA exercises with verbal cues. Pt stayed up in chair with ice on knee and needs in reach. Hope to further progress this afternoon. This section established at most recent assessment   PROBLEM LIST (Impairments causing functional limitations):  1. Decreased Strength  2. Decreased ADL/Functional Activities  3. Decreased Transfer Abilities  4. Decreased Ambulation Ability/Technique  5. Decreased Balance  6. Increased Pain  7. Decreased Activity Tolerance  8. Decreased Flexibility/Joint Mobility  9. Decreased Candler with Home Exercise Program   INTERVENTIONS PLANNED: (Benefits and precautions of physical therapy have been discussed with the patient.)  1. Bed Mobility  2. Gait Training  3. Home Exercise Program (HEP)  4. Therapeutic Exercise/Strengthening  5. Transfer Training  6. Range of Motion: active/assisted/passive  7. Therapeutic Activities  8. Group Therapy     TREATMENT PLAN: Frequency/Duration: Follow patient BID for duration of hospital stay to address above goals. Rehabilitation Potential For Stated Goals: Good     RECOMMENDED REHABILITATION/EQUIPMENT: (at time of discharge pending progress): Continue Skilled Therapy and Rehab. HISTORY:   History of Present Injury/Illness (Reason for Referral): The patient has end stage arthritis of the right knee. The patient was evaluated and examined during a consultation prior to today. The patient complains of knee pain with activities, reports pain as mostly occurring along the joint lines, reports stiffness of the knee with prolonged inactivity, and swelling/pain at the end of the day and after increased physical activity. The pain affects the patients activities of daily living and quality of life. The patient has attempted and exhausted conservative treatment. There have been no changes to the patient's orthopedic condition since the last office visit.     Past Medical History/Comorbidities:   Ms. Melvin Welch  has a past medical history of Arthritis; Asthma; Breast CA ([facility] [address] Cardiac arrhythmia (03/2013); Chronic obstructive pulmonary disease ([facility] [address] Chronic pain;  Former smoker; GERD (gastroesophageal reflux disease); Heart failure ([facility] [address] Heart murmur; High cholesterol; Hypertension; Incontinence in female; Migraine; Mild aortic stenosis; Oxygen dependent; and Thyroid disease. She also has no past medical history of Adverse effect of anesthesia; Aneurysm ([facility] [address] Autoimmune disease ([facility] [address] CAD (coronary artery disease); Chronic kidney disease; Coagulation disorder ([facility] [address] Diabetes ([facility] [address] Difficult intubation; Endocarditis; Ill-defined condition; Liver disease; Malignant hyperthermia due to anesthesia; Morbid obesity ([facility] [address] Nausea & vomiting; Nicotine vapor product user; Non-nicotine vapor product user; Pseudocholinesterase deficiency; Psychiatric disorder; PUD (peptic ulcer disease); Rheumatic fever; Seizures ([facility] [address] Sleep apnea; Stroke [facility]); or Thromboembolus ([facility] [address] Ms. Luh Villalobos  has a past surgical history that includes hx hysterectomy; hx appendectomy; hx splenectomy (1998); hx cholecystectomy; hx breast lumpectomy (Right, 10/31/2012); hx open reduction internal fixation (Right, 05/31/2018); hx other surgical; and hx knee arthroscopy (Right). Social History/Living Environment:   Home Environment: Private residence  # Steps to Enter: 1  Rails to Enter: No  One/Two Story Residence: One story  Living Alone: No  Support Systems: Family member(s)  Patient Expects to be Discharged to[de-identified] Skilled nursing facility  Current DME Used/Available at Home: None  Tub or Shower Type: Tub/Shower combination  Prior Level of Function/Work/Activity:  Pt was non-ambulatory for the last 6 weeks, but the last 2 weeks of this 6 week period prior to this admission, this pt was needing SBA for transfers only.    Number of Personal Factors/Comorbidities that affect the Plan of Care: 3+: HIGH COMPLEXITY   EXAMINATION:   Most Recent Physical Functioning:                            Bed Mobility  Supine to Sit: Contact guard assistance  Scooting: Contact guard assistance    Transfers  Sit to Stand: Contact guard assistance  Stand to Sit: Contact guard assistance  Bed to Chair: Contact guard assistance    Balance  Sitting: Intact  Standing: With support         Gait Training: Yes    Weight Bearing Status  Right Side Weight Bearing: As tolerated  Distance (ft): 80 Feet (ft)  Ambulation - Level of Assistance: Minimal assistance  Assistive Device: Walker, rolling  Speed/Alissa: Pace decreased (<100 feet/min); Shuffled  Step Length: Left shortened;Right shortened  Stance: Right decreased  Gait Abnormalities: Antalgic;Decreased step clearance  Interventions: Safety awareness training;Verbal cues     Braces/Orthotics: none    Right Knee Cold  Type: Cryocuff      Body Structures Involved:  1. Joints  2. Muscles Body Functions Affected:  1. Sensory/Pain  2. Movement Related Activities and Participation Affected:  1. General Tasks and Demands  2. Mobility   Number of elements that affect the Plan of Care: 3: MODERATE COMPLEXITY   CLINICAL PRESENTATION:   Presentation: Evolving clinical presentation with unstable and unpredictable characteristics: HIGH COMPLEXITY   CLINICAL DECISION MAKIN [facility] [id number] AM-PAC 6 Clicks   Basic Mobility Inpatient Short Form  How much difficulty does the patient currently have. .. Unable A Lot A Little None   1. Turning over in bed (including adjusting bedclothes, sheets and blankets)? [] 1   [] 2   [x] 3   [] 4   2. Sitting down on and standing up from a chair with arms ( e.g., wheelchair, bedside commode, etc.)   [] 1   [] 2   [x] 3   [] 4   3. Moving from lying on back to sitting on the side of the bed? [] 1   [] 2   [x] 3   [] 4   How much help from another person does the patient currently need. .. Total A Lot A Little None   4. Moving to and from a bed to a chair (including a wheelchair)? [] 1   [] 2   [x] 3   [] 4   5. Need to walk in hospital room? [] 1   [] 2   [x] 3   [] 4   6. Climbing 3-5 steps with a railing?    [x] 1   [] 2   [] 3   [] 4   © 2007, Trustees of [employer], under license to [employer]. All rights reserved        Score:  Initial: 16 Most Recent: X (Date: -- )    Interpretation of Tool:  Represents activities that are increasingly more difficult (i.e. Bed mobility, Transfers, Gait). Score 24 23 22-20 19-15 14-10 9-7 6     Modifier CH CI CJ CK CL CM CN      ? Mobility - Walking and Moving Around:     - CURRENT STATUS: CK - 40%-59% impaired, limited or restricted    - GOAL STATUS: CJ - 20%-39% impaired, limited or restricted    - D/C STATUS:  ---------------To be determined---------------  [place] / Product Type: Medicare /      Medical Necessity:     · Patient is expected to demonstrate progress in strength, range of motion, balance, coordination and functional technique to decrease assistance required with bed mobility, transfers & gait. Reason for Services/Other Comments:  · Patient continues to require skilled intervention due to pt unsafe with functional mobility. Use of outcome tool(s) and clinical judgement create a POC that gives a: Difficult prediction of patient's progress: HIGH COMPLEXITY            TREATMENT:   (In addition to Assessment/Re-Assessment sessions the following treatments were rendered)     Pre-treatment Symptoms/Complaints:  weakness  Pain Initial: visual scale  Pain Intensity 1:  (some soreness with exercises)  Post Session:  0/10     Therapeutic Exercise: (11 Minutes):  Exercises per grid below to improve mobility and dynamic movement of leg - right to improve functional endurance. Required minimal verbal cues to promote proper body alignment and promote proper body mechanics. Progressed range and repetitions as indicated. Gait Training (12 Minutes):  Gait training to improve and/or restore physical functioning as related to mobility and strength.   Ambulated 80 Feet (ft) with Minimal assistance using a Walker, rolling and minimal Safety awareness training;Verbal cues related to their stance phase and stride length to promote proper body alignment and promote proper body mechanics. Instruction in performance of walker use and gait sequencing to correct stance phase and stride length. Date:  7/9 Date:  7/10/18 Date:     ACTIVITY/EXERCISE AM PM AM PM AM PM   GROUP THERAPY  []  []  []  []  []  []   Ankle Pumps  10 10      Quad Sets  10 10      Gluteal Sets  10 10      Hip ABd/ADduction  10 15      Straight Leg Raises  10 15      Knee Slides  10 15      Short Arc Quads  10       Long Arc Quads         Chair Slides                  B = bilateral; AA = active assistive; A = active; P = passive      Treatment/Session Assessment:     Response to Treatment:  Pt did well this morning with therapy    Education:  [x] Home Exercises  [x] Fall Precautions  [x] no pillow under knee [] D/C Instruction Review  [] Knee Prosthesis Review  [x] Walker Management/Safety [] Adaptive Equipment as Needed       Interdisciplinary Collaboration:   o Registered Nurse  o Rehabilitation Attendant    After treatment position/precautions:   o Up in chair  o Bed/Chair-wheels locked  o Call light within reach  o Family at bedside    Compliance with Program/Exercises: compliant all the time. Recommendations/Intent for next treatment session:  Treatment next visit will focus on increasing Ms. Vila's independence with bed mobility, transfers, gait training, strength/ROM exercises, modalities for pain, and patient education.       Total Treatment Duration:  PT Patient Time In/Time Out  Time In: 1023  Time Out: Bishop Butler 13., PT

## 2018-07-10 NOTE — PROGRESS NOTES
Pt resting quietly in bed,  Reminded pt to try to keep her arm straighter so her IV doesn't beep. Pt denies c/o pain at this ti me. No  Noted distress.

## 2018-07-10 NOTE — PROGRESS NOTES
Problem: Self Care Deficits Care Plan (Adult)  Goal: *Acute Goals and Plan of Care (Insert Text)  GOALS: all goals met 7/10/2018  DISCHARGE GOALS (in preparation for going home/rehab):  7 days may continue goals at rehab facility   1. Ms. Libia Hollins will perform one lower body dressing activity with minimal assistance required to demonstrate improved functional mobility and safety. 2.  Ms. Libia Hollins will perform one lower body bathing activity with minimal assistance required to demonstrate improved functional mobility and safety. 3.  Ms. Libia Hollins will perform toileting/toilet transfer with contact guard assistance to demonstrate improved functional mobility and safety. 4.  Ms. Libia Hollins will perform shower transfer with contact guard assistance to demonstrate improved functional mobility and safety. JOINT CAMP OCCUPATIONAL THERAPY TKA: Daily Note and Discharge 7/10/2018  INPATIENT: Hospital Day: 2  [place] / Product Type: Medicare /      NAME/AGE/GENDER: Ramon Botello is a 80 y.o. female   PRIMARY DIAGNOSIS:  Osteoarthritis of right knee, unspecified osteoarthritis type [M17.11]   Procedure(s) and Anesthesia Type:     * RIGHT KNEE ARTHROPLASTY TOTAL SOUMYA CEMENTED / ANCEF 2gm / Lacie Luiza - Spinal (Right)  ICD-10: Treatment Diagnosis:    · Pain in Right Knee (M25.561)  · Stiffness of Right Knee, Not elsewhere classified (D17.009)      ASSESSMENT:     Ms. Libia Hollins  is s/p R TKA and presents with decreased weight bearing on R LE and decreased independence with functional mobility and activities of daily living. Patient completed shower and dressing as charter below in ADL grid and is ambulating with rolling walker and contact guard assist.  Patient has met 4/4 goals and plans to return home with good family support. Family able to provide patient with appropriate level of assistance at this time.   OT reviewed safety precautions throughout session and therapy schedule for the remainder of today. Patient instructed to call for assistance when needing to get up from recliner and all needs in reach. Patient verbalized understanding of call light. This section established at most recent assessment   PROBLEM LIST (Impairments causing functional limitations):  1. Decreased Strength  2. Decreased ADL/Functional Activities  3. Decreased Transfer Abilities  4. Increased Pain  5. Increased Fatigue  6. Decreased Flexibility/Joint Mobility  7. Decreased Knowledge of Precautions   INTERVENTIONS PLANNED: (Benefits and precautions of occupational therapy have been discussed with the patient.)  1. Activities of daily living training  2. Adaptive equipment training  3. Balance training  4. Clothing management  5. Donning&doffing training  6. Theraputic activity     TREATMENT PLAN: Frequency/Duration: Follow patient 1-2 times to address above goals. Rehabilitation Potential For Stated Goals: Good     RECOMMENDED REHABILITATION/EQUIPMENT: (at time of discharge pending progress): Continue Skilled Therapy and Home Health: Physical Therapy. OCCUPATIONAL PROFILE AND HISTORY:   History of Present Injury/Illness (Reason for Referral): Pt presents this date s/p (right) TKA. Past Medical History/Comorbidities:   Ms. Nikolas Brooks  has a past medical history of Arthritis; Asthma; Breast CA ([address] Cardiac arrhythmia (03/2013); Chronic obstructive pulmonary disease ([address] Chronic pain; Former smoker; GERD (gastroesophageal reflux disease); Heart failure ([address] Heart murmur; High cholesterol; Hypertension; Incontinence in female; Migraine; Mild aortic stenosis; Oxygen dependent; and Thyroid disease. She also has no past medical history of Adverse effect of anesthesia; Aneurysm ([address] Autoimmune disease ([address] CAD (coronary artery disease); Chronic kidney disease; Coagulation disorder ([address] Diabetes ([address] Difficult intubation; Endocarditis;  Ill-defined condition; Liver disease; Malignant hyperthermia due to anesthesia; Morbid obesity ([facility] [address] Nausea & vomiting; Nicotine vapor product user; Non-nicotine vapor product user; Pseudocholinesterase deficiency; Psychiatric disorder; PUD (peptic ulcer disease); Rheumatic fever; [facility] Sleep apnea; Stroke [facility]); or Thromboembolus ([facility] Ms. Noreen Tellez  has a past surgical history that includes hx hysterectomy; hx appendectomy; hx splenectomy (1998); hx cholecystectomy; hx breast lumpectomy (Right, 10/31/2012); hx open reduction internal fixation (Right, 05/31/2018); hx other surgical; and hx knee arthroscopy (Right). Social History/Living Environment:   Home Environment: Private residence  # Steps to Enter: 1  Rails to Enter: No  One/Two Story Residence: One story  Living Alone: No  Support Systems: Family member(s)  Patient Expects to be Discharged to[de-identified] Skilled nursing facility  Current DME Used/Available at Home: None  Tub or Shower Type: Tub/Shower combination  Prior Level of Function/Work/Activity:  Using a wheelchair for most mobility and minimal assistance with self care     Number of Personal Factors/Comorbidities that affect the Plan of Care: Brief history (0):  LOW COMPLEXITY   ASSESSMENT OF OCCUPATIONAL PERFORMANCE[de-identified]   Most Recent Physical Functioning:   Balance  Sitting: Intact  Standing: With support                              Mental Status  Neurologic State: Alert  Orientation Level: Oriented X4  Cognition: Follows commands  Perception: Appears intact  Perseveration: No perseveration noted  Safety/Judgement: Awareness of environment; Fall prevention                Basic ADLs (From Assessment) Complex ADLs (From Assessment)   Basic ADL  Feeding: Independent  Oral Facial Hygiene/Grooming: Setup  Bathing:  Moderate assistance  Type of Bath: Chlorhexidine (CHG), Full, Shower  Upper Body Dressing: Setup  Lower Body Dressing: Maximum assistance  Toileting: Maximum assistance     Grooming/Bathing/Dressing Activities of Daily Living Grooming  Grooming Assistance: Supervision/set up Cognitive Retraining  Safety/Judgement: Awareness of environment; Fall prevention   Upper Body Bathing  Bathing Assistance: Supervision/set-up  Position Performed: Seated in chair  Cues: Verbal cues provided  Adaptive Equipment: Grab bar; Shower chair Feeding  Feeding Assistance: Supervision/set-up   Lower Body Bathing  Bathing Assistance: Supervision/set-up  Perineal  : Supervision/set-up  Position Performed: Seated in chair  Cues: Verbal cues provided  Adaptive Equipment: Grab bar  Lower Body : Supervision/set-up  Position Performed: Bending forward method;Seated in chair  Cues: Verbal cues provided  Adaptive Equipment: Grab bar; Shower chair Toileting  Toileting Assistance: Supervision/set up   Upper Body Dressing Assistance  Dressing Assistance: Supervision/set-up  Pullover Shirt: Supervision/set-up Functional Transfers  Bathroom Mobility: Contact guard assistance  Toilet Transfer : Contact guard assistance  Shower Transfer: Contact guard assistance  Cues: Verbal cues provided  Adaptive Equipment: Grab bars; Bedside commode; Shower chair with back; Walker (comment)   Lower Body Dressing Assistance  Dressing Assistance: Minimum assistance  Protective Undergarmet: Minimum assistance  Pants With Elastic Waist: Minimum assistance  Socks: Compensatory technique training ( socks)  Adaptive Equipment Used: Grab bar;Walker Bed/Mat Mobility  Supine to Sit: Contact guard assistance  Sit to Stand: Contact guard assistance  Bed to Chair: Contact guard assistance  Scooting: Contact guard assistance         Physical Skills Involved:  1. Range of Motion  2. Balance  3. Strength Cognitive Skills Affected (resulting in the inability to perform in a timely and safe manner): 1. none Psychosocial Skills Affected:  1.  Environmental Adaptation   Number of elements that affect the Plan of Care: 3-5:  MODERATE COMPLEXITY   CLINICAL DECISION MAKIN [facility] [address] [address] AM-PAC 3 Clicks   Daily Activity Inpatient Short Form  How much help from another person does the patient currently need. .. Total A Lot A Little None   1. Putting on and taking off regular lower body clothing? [] 1   [] 2   [x] 3   [] 4   2. Bathing (including washing, rinsing, drying)? [] 1   [] 2   [] 3   [x] 4   3. Toileting, which includes using toilet, bedpan or urinal?   [] 1   [] 2   [] 3   [x] 4   4. Putting on and taking off regular upper body clothing? [] 1   [] 2   [] 3   [x] 4   5. Taking care of personal grooming such as brushing teeth? [] 1   [] 2   [] 3   [x] 4   6. Eating meals? [] 1   [] 2   [] 3   [x] 4   © 2007, Trustees of [address] [place] [address], under license to [employer]. All rights reserved     Score:  Initial:18 Most Recent: X23(Date: 7-10-18 )    Interpretation of Tool:  Represents activities that are increasingly more difficult (i.e. Bed mobility, Transfers, Gait). Score 24 23 22-20 19-15 14-10 9-7 6     Modifier CH CI CJ CK CL CM CN      ? Self Care:     - CURRENT STATUS: CK - 40%-59% impaired, limited or restricted    - GOAL STATUS: CJ - 20%-39% impaired, limited or restricted    - D/C STATUS:  CI - 1%-19% impaired, limited or restricted  [place] / Product Type: Medicare /      Medical Necessity:     · Patient is expected to demonstrate progress in range of motion, balance and functional technique to increase independence with self care. Reason for Services/Other Comments:  · Patient would benefit from skilled Occupational Therapy to maximize independence and safety with self-care task and functional mobility.  .   Use of outcome tool(s) and clinical judgement create a POC that gives a: LOW COMPLEXITY            TREATMENT:   (In addition to Assessment/Re-Assessment sessions the following treatments were rendered)     Pre-treatment Symptoms/Complaints:  Pt tolerated shower  Pain: Initial:   Pain Intensity 1: 6  Pain Location 1: Knee  Pain Orientation 1: Right  Pain Intervention(s) 1: Repositioned 0 Post Session:  6     Self Care: (45): Procedure(s) (per grid) utilized to improve and/or restore self-care/home management as related to dressing, bathing, toileting and grooming. Required minimal verbal and   cueing to facilitate activities of daily living skills and compensatory activities. Treatment/Session Assessment:     Response to Treatment:  Pt tolerated treatment well    Education:  [] Home Exercises  [x] Fall Precautions  [] Hip Precautions [] Going Home Video  [x] Knee/Hip Prosthesis Review  [x] Walker Management/Safety [x] Adaptive Equipment as Needed       Interdisciplinary Collaboration:   o Occupational Therapist  o Registered Nurse    After treatment position/precautions:   o Up in chair  o Bed/Chair-wheels locked  o Call light within reach  o RN notified     Compliance with Program/Exercises: compliant all of the time. Recommendations: Pt progressing with Occupational Therapy and has met 5/5 goals. Patient plans for  continue therapy services at a short term rehabilitation facility. Discharge acute care Occupational Therapy services.        Total Treatment Duration:  OT Patient Time In/Time Out  Time In: 0830  Time Out: Beck 36, OT

## 2018-07-10 NOTE — PROGRESS NOTES
Pt resting in bed. C/o pain 5/10 to her right knee. 7.5 mg Norco given PO along with HS meds. Assisted to to get repositioned in the bed for comfort. Dressing to right knee clean dry and intact,  NV status WNL's  strong dorsiflexion bilaterally. Will continue to monitor.

## 2018-07-10 NOTE — PROGRESS NOTES
Problem: Mobility Impaired (Adult and Pediatric)  Goal: *Acute Goals and Plan of Care (Insert Text)  GOALS (1-4 days):  (1.)Ms. Luh Villalobos will move from supine to sit and sit to supine  in bed with SUPERVISION. (2.)Ms. Luh Villalobos will transfer from bed to chair and chair to bed with STAND BY ASSIST using the least restrictive device. (3.)Ms. Luh Villalobos will ambulate with STAND BY ASSIST for  feet with the least restrictive device. (4.)Ms. Luh Villalobos will ambulate up/down 1 steps with No railing with MINIMAL ASSIST with walker. (5.)Ms. Luh Villalobos will increase right knee ROM to 5°-80°.  ________________________________________________________________________________________________    PHYSICAL THERAPY [facility]: Daily Note, Treatment Day: 1st, PM 7/10/2018  INPATIENT: Hospital Day: 2  [place] / [place] / Product Type: Medicare /      NAME/AGE/GENDER: Rikki Ventura is a 80 y.o. female   PRIMARY DIAGNOSIS:  Osteoarthritis of right knee, unspecified osteoarthritis type [M17.11]   Procedure(s) and Anesthesia Type:     * RIGHT KNEE ARTHROPLASTY TOTAL SOUMYA CEMENTED / ANCEF 2gm / Gaylin He - Spinal (Right)  ICD-10: Treatment Diagnosis:    · Pain in Right Knee (M25.561)  · Stiffness of Right Knee, Not elsewhere classified (M25.661)  · Difficulty in walking, Not elsewhere classified (R26.2)      ASSESSMENT:     Ms. Luh Villalobos presents with impaired strength & mobility s/p right TKA. Pt also had decreased stability during out of bed activity. Pt will benefit from follow up therapy to help restore safe function prior to returning home with caregiver. If no caregiver available on DC pt will need a rehab stay at [facility].  7/10/18am-pt up in chair on contact and ready for therapy. Worked on gait training in arthur with verbal cues. Back in room in chair worked on TKA exercises with verbal cues. Pt stayed up in chair with ice on knee and needs in reach. Hope to further progress this afternoon. 7/10pm-pt up in chair on contact and agreeable to therapy. Wanted to use the restroom first. When she came out of bathroom with assist we started to work on gait but pt stated she was dizzy so rolled pt down to the gym. Worked on TKA exercises with verbal cues. Then worked on gait training back to room and pt had no more dizziness. Wanted to lay back down in the bed, ice in place and needs in reach. Hope to further progress in the morning. This section established at most recent assessment   PROBLEM LIST (Impairments causing functional limitations):  1. Decreased Strength  2. Decreased ADL/Functional Activities  3. Decreased Transfer Abilities  4. Decreased Ambulation Ability/Technique  5. Decreased Balance  6. Increased Pain  7. Decreased Activity Tolerance  8. Decreased Flexibility/Joint Mobility  9. Decreased [place] with Home Exercise Program   INTERVENTIONS PLANNED: (Benefits and precautions of physical therapy have been discussed with the patient.)  1. Bed Mobility  2. Gait Training  3. Home Exercise Program (HEP)  4. Therapeutic Exercise/Strengthening  5. Transfer Training  6. Range of Motion: active/assisted/passive  7. Therapeutic Activities  8. Group Therapy     TREATMENT PLAN: Frequency/Duration: Follow patient BID for duration of hospital stay to address above goals. Rehabilitation Potential For Stated Goals: Good     RECOMMENDED REHABILITATION/EQUIPMENT: (at time of discharge pending progress): Continue Skilled Therapy and Rehab. HISTORY:   History of Present Injury/Illness (Reason for Referral): The patient has end stage arthritis of the right knee. The patient was evaluated and examined during a consultation prior to today. The patient complains of knee pain with activities, reports pain as mostly occurring along the joint lines, reports stiffness of the knee with prolonged inactivity, and swelling/pain at the end of the day and after increased physical activity.  The pain affects the patients activities of daily living and quality of life. The patient has attempted and exhausted conservative treatment. There have been no changes to the patient's orthopedic condition since the last office visit.     Past Medical History/Comorbidities:   Ms. Meagan Braden  has a past medical history of Arthritis; Asthma; Breast CA ([address] Cardiac arrhythmia (03/2013); Chronic obstructive pulmonary disease ([address] Chronic pain; Former smoker; GERD (gastroesophageal reflux disease); Heart failure ([address] Heart murmur; High cholesterol; Hypertension; Incontinence in female; Migraine; Mild aortic stenosis; Oxygen dependent; and Thyroid disease. She also has no past medical history of Adverse effect of anesthesia; Aneurysm ([address] Autoimmune disease ([address] CAD (coronary artery disease); Chronic kidney disease; Coagulation disorder ([address] Diabetes ([address] Difficult intubation; Endocarditis; Ill-defined condition; Liver disease; Malignant hyperthermia due to anesthesia; Morbid obesity ([address] Nausea & vomiting; Nicotine vapor product user; Non-nicotine vapor product user; Pseudocholinesterase deficiency; Psychiatric disorder; PUD (peptic ulcer disease); Rheumatic fever; Seizures ([address] Sleep apnea; Stroke [facility]); or Thromboembolus ([address] Ms. Meagan Braden  has a past surgical history that includes hx hysterectomy; hx appendectomy; hx splenectomy (1998); hx cholecystectomy; hx breast lumpectomy (Right, 10/31/2012); hx open reduction internal fixation (Right, 05/31/2018); hx other surgical; and hx knee arthroscopy (Right).   Social History/Living Environment:   Home Environment: Private residence  # Steps to Enter: 1  Rails to Enter: No  One/Two Story Residence: One story  Living Alone: No  Support Systems: Family member(s)  Patient Expects to be Discharged to[de-identified] Skilled nursing facility  Current DME Used/Available at Home: None  Tub or Shower Type: Tub/Shower combination  Prior Level of Function/Work/Activity:  Pt was non-ambulatory for the last 6 weeks, but the last 2 weeks of this 6 week period prior to this admission, this pt was needing SBA for transfers only. Number of Personal Factors/Comorbidities that affect the Plan of Care: 3+: HIGH COMPLEXITY   EXAMINATION:   Most Recent Physical Functioning:                RLE PROM  R Knee Flexion: 98  R Knee Extension: 12           Bed Mobility  Supine to Sit: Contact guard assistance  Sit to Supine: Stand-by assistance  Scooting: Contact guard assistance    Transfers  Sit to Stand: Contact guard assistance  Stand to Sit: Contact guard assistance  Bed to Chair: Contact guard assistance    Balance  Sitting: Intact  Standing: With support         Gait Training: Yes    Weight Bearing Status  Right Side Weight Bearing: As tolerated  Distance (ft): 160 Feet (ft)  Ambulation - Level of Assistance: Minimal assistance  Assistive Device: Walker, rolling  Speed/Alissa: Pace decreased (<100 feet/min)  Step Length: Left shortened;Right shortened  Stance: Right decreased  Gait Abnormalities: Antalgic;Decreased step clearance  Interventions: Safety awareness training;Verbal cues     Braces/Orthotics: none    Right Knee Cold  Type: Cryocuff      Body Structures Involved:  1. Joints  2. Muscles Body Functions Affected:  1. Sensory/Pain  2. Movement Related Activities and Participation Affected:  1. General Tasks and Demands  2. Mobility   Number of elements that affect the Plan of Care: 3: MODERATE COMPLEXITY   CLINICAL PRESENTATION:   Presentation: Evolving clinical presentation with unstable and unpredictable characteristics: HIGH COMPLEXITY   CLINICAL DECISION MAKIN [facility] [address] [id number] AM-PAC 6 Clicks   Basic Mobility Inpatient Short Form  How much difficulty does the patient currently have. .. Unable A Lot A Little None   1. Turning over in bed (including adjusting bedclothes, sheets and blankets)? [] 1   [] 2   [x] 3   [] 4   2.   Sitting down on and standing up from a chair with arms ( e.g., wheelchair, bedside commode, etc.)   [] 1   [] 2   [x] 3   [] 4   3. Moving from lying on back to sitting on the side of the bed? [] 1   [] 2   [x] 3   [] 4   How much help from another person does the patient currently need. .. Total A Lot A Little None   4. Moving to and from a bed to a chair (including a wheelchair)? [] 1   [] 2   [x] 3   [] 4   5. Need to walk in hospital room? [] 1   [] 2   [x] 3   [] 4   6. Climbing 3-5 steps with a railing? [x] 1   [] 2   [] 3   [] 4   © 2007, Trustees of [address] [place] [address] [address] [id number], under license to [employer]. All rights reserved        Score:  Initial: 16 Most Recent: X (Date: -- )    Interpretation of Tool:  Represents activities that are increasingly more difficult (i.e. Bed mobility, Transfers, Gait). Score 24 23 22-20 19-15 14-10 9-7 6     Modifier CH CI CJ CK CL CM CN      ? Mobility - Walking and Moving Around:     - CURRENT STATUS: CK - 40%-59% impaired, limited or restricted    - GOAL STATUS: CJ - 20%-39% impaired, limited or restricted    - D/C STATUS:  ---------------To be determined---------------  [place] / [place] / Product Type: Medicare /      Medical Necessity:     · Patient is expected to demonstrate progress in strength, range of motion, balance, coordination and functional technique to decrease assistance required with bed mobility, transfers & gait. Reason for Services/Other Comments:  · Patient continues to require skilled intervention due to pt unsafe with functional mobility.    Use of outcome tool(s) and clinical judgement create a POC that gives a: Difficult prediction of patient's progress: HIGH COMPLEXITY            TREATMENT:   (In addition to Assessment/Re-Assessment sessions the following treatments were rendered)     Pre-treatment Symptoms/Complaints:  weakness  Pain Initial: visual scale  Pain Intensity 1:  (no reports of pain)  Post Session:  0/10     Therapeutic Exercise: (45 Minutes (group)):  Exercises per grid below to improve mobility and dynamic movement of leg - right to improve functional endurance. Required minimal verbal cues to promote proper body alignment and promote proper body mechanics. Progressed range and repetitions as indicated. Gait Training (15 Minutes):  Gait training to improve and/or restore physical functioning as related to mobility and strength. Ambulated 160 Feet (ft) with Minimal assistance using a Walker, rolling and minimal Safety awareness training;Verbal cues related to their stance phase and stride length to promote proper body alignment and promote proper body mechanics. Instruction in performance of walker use and gait sequencing to correct stance phase and stride length. Date:  7/9 Date:  7/10/18 Date:     ACTIVITY/EXERCISE AM PM AM PM AM PM   GROUP THERAPY  []  []  []  [x]  []  []   Ankle Pumps  10 10 15     Quad Sets  10 10 15     Gluteal Sets  10 10 15     Hip ABd/ADduction  10 15 15     Straight Leg Raises  10 15 15     Knee Slides  10 15 15     Short Arc Quads  10  15     Long Arc Quads         Chair Slides    15              B = bilateral; AA = active assistive; A = active; P = passive      Treatment/Session Assessment:     Response to Treatment:  Pt making nice progress this afternoon    Education:  [x] Home Exercises  [x] Fall Precautions  [x] no pillow under knee [x] D/C Instruction Review  [x] Knee Prosthesis Review  [x] Walker Management/Safety [] Adaptive Equipment as Needed       Interdisciplinary Collaboration:   o Registered Nurse  o Rehabilitation Attendant    After treatment position/precautions:   o Supine in bed  o Bed/Chair-wheels locked  o Bed in low position  o Call light within reach  o Family at bedside    Compliance with Program/Exercises: compliant all the time. Recommendations/Intent for next treatment session:  Treatment next visit will focus on increasing Ms. Vila's independence with bed mobility, transfers, gait training, strength/ROM exercises, modalities for pain, and patient education.       Total Treatment Duration:  PT Patient Time In/Time Out  Time In: 1300  Time Out: [address], [place]

## 2018-07-10 NOTE — PROGRESS NOTES
July 10, 2018         Post Op day: 1 Day Post-Op     Admit Date: 2018  Admit Diagnosis: Osteoarthritis of right knee, unspecified osteoarthritis type [M17.11]        Subjective: Patient stable. No acute events. Objective:   Visit Vitals    /59 (BP 1 Location: Left arm, BP Patient Position: At rest)    Pulse 73    Temp 97.5 °F (36.4 °C)    Resp 18    SpO2 99%    Temp (24hrs), Av.8 °F (36.6 °C), Min:97.3 °F (36.3 °C), Max:99 °F (37.2 °C)    Lab Results   Component Value Date/Time    HGB 10.0 (L) 07/10/2018 05:41 AM     Extremity Exam  Dressing clean and dry   Tibialis Anterior and Gastroc-Soleus functioning normally right lower extremity  Sensation intact to light touch on operative limb  Extremity perfused  TEDS/SCDS in place  No sign of DVT     Assessment / Plan :  WBAT RLE  Continue PT/OT  Continue current DVT prophylaxis in house. Discharge on ASA BID  Patient Active Problem List   Diagnosis Code    Simple chronic bronchitis (HCC) J41.0    HTN (hypertension) I10    Pure hypercholesterolemia E78.00    History of breast cancer Z85.3    Osteoporosis M81.0    Memory loss R41.3    Hallucinations, visual R44.1    Osteoarthritis of both knees M17.0    Chronic kidney disease, stage III (moderate) N18.3    H/O splenectomy Z90.81    Chronic pain syndrome G89.4    Congestive heart failure (HCC) I50.9    Nonsustained ventricular tachycardia (HCC) I47.2    Abnormal urinalysis R82.90    Baseline forced expiratory volume at end of one second (FEV1) 30% to 80% predicted Z78.9    OA (osteoarthritis) of knee M17.10          Signed By: Carolee Gilford, MD     I have reviewed the patients controlled substance prescription history, as maintained in the [place] prescription monitoring program, so that the prescription(s) for a  controlled substance can be given.

## 2018-07-10 NOTE — PROGRESS NOTES
Shift Assessment Complete. Pt is post op day 1 from [facility]. Pt is A&Ox 3.  +2 pedal pulses with purposeful movement in all four extremities. Dressing is clean, dry and intact. PIV capped. Pt c/o pain. See MAR . Bed low and locked. Side rails x3. Call light with in reach. Pt verbalizes understanding of call light.

## 2018-07-11 LAB
BACTERIA SPEC CULT: NORMAL
HGB BLD-MCNC: 10.2 G/DL (ref 11.7–15.4)
MM INDURATION POC: NORMAL MM (ref 0–5)
PPD POC: NORMAL NEGATIVE
SERVICE CMNT-IMP: NORMAL

## 2018-07-11 PROCEDURE — 94760 N-INVAS EAR/PLS OXIMETRY 1: CPT

## 2018-07-11 PROCEDURE — 97150 GROUP THERAPEUTIC PROCEDURES: CPT

## 2018-07-11 PROCEDURE — 97116 GAIT TRAINING THERAPY: CPT

## 2018-07-11 PROCEDURE — 74011000250 HC RX REV CODE- 250: Performed by: ORTHOPAEDIC SURGERY

## 2018-07-11 PROCEDURE — 94640 AIRWAY INHALATION TREATMENT: CPT

## 2018-07-11 PROCEDURE — 97110 THERAPEUTIC EXERCISES: CPT

## 2018-07-11 PROCEDURE — 94762 N-INVAS EAR/PLS OXIMTRY CONT: CPT

## 2018-07-11 PROCEDURE — 65270000029 HC RM PRIVATE

## 2018-07-11 PROCEDURE — 36415 COLL VENOUS BLD VENIPUNCTURE: CPT | Performed by: ORTHOPAEDIC SURGERY

## 2018-07-11 PROCEDURE — 74011250637 HC RX REV CODE- 250/637: Performed by: ORTHOPAEDIC SURGERY

## 2018-07-11 PROCEDURE — 85018 HEMOGLOBIN: CPT | Performed by: ORTHOPAEDIC SURGERY

## 2018-07-11 RX ADMIN — LORAZEPAM 0.5 MG: 1 TABLET ORAL at 16:15

## 2018-07-11 RX ADMIN — ASPIRIN 81 MG: 81 TABLET, COATED ORAL at 08:49

## 2018-07-11 RX ADMIN — THEOPHYLLINE 300 MG: 300 TABLET, EXTENDED RELEASE ORAL at 20:25

## 2018-07-11 RX ADMIN — ONDANSETRON 8 MG: 8 TABLET, ORALLY DISINTEGRATING ORAL at 11:34

## 2018-07-11 RX ADMIN — SENNOSIDES AND DOCUSATE SODIUM 2 TABLET: 8.6; 5 TABLET ORAL at 08:49

## 2018-07-11 RX ADMIN — FUROSEMIDE 20 MG: 20 TABLET ORAL at 08:49

## 2018-07-11 RX ADMIN — LISINOPRIL 10 MG: 5 TABLET ORAL at 08:49

## 2018-07-11 RX ADMIN — METOPROLOL TARTRATE 25 MG: 25 TABLET ORAL at 08:49

## 2018-07-11 RX ADMIN — LEVOTHYROXINE SODIUM 50 MCG: 50 TABLET ORAL at 06:19

## 2018-07-11 RX ADMIN — Medication 500 MG: at 16:15

## 2018-07-11 RX ADMIN — ALBUTEROL SULFATE 2.5 MG: 2.5 SOLUTION RESPIRATORY (INHALATION) at 15:51

## 2018-07-11 RX ADMIN — ALBUTEROL SULFATE 2.5 MG: 2.5 SOLUTION RESPIRATORY (INHALATION) at 08:02

## 2018-07-11 RX ADMIN — ASPIRIN 81 MG: 81 TABLET, COATED ORAL at 20:25

## 2018-07-11 RX ADMIN — HYDROCODONE BITARTRATE AND ACETAMINOPHEN 1 TABLET: 7.5; 325 TABLET ORAL at 20:25

## 2018-07-11 RX ADMIN — METOPROLOL TARTRATE 25 MG: 25 TABLET ORAL at 20:25

## 2018-07-11 RX ADMIN — ATORVASTATIN CALCIUM 20 MG: 10 TABLET, FILM COATED ORAL at 20:25

## 2018-07-11 RX ADMIN — HYDROCODONE BITARTRATE AND ACETAMINOPHEN 1 TABLET: 7.5; 325 TABLET ORAL at 16:12

## 2018-07-11 RX ADMIN — VITAMIN D, TAB 1000IU (100/BT) 1000 UNITS: 25 TAB at 08:49

## 2018-07-11 RX ADMIN — Medication 400 MG: at 08:49

## 2018-07-11 RX ADMIN — HYDROCODONE BITARTRATE AND ACETAMINOPHEN 1 TABLET: 7.5; 325 TABLET ORAL at 03:14

## 2018-07-11 RX ADMIN — HYDROCODONE BITARTRATE AND ACETAMINOPHEN 1 TABLET: 7.5; 325 TABLET ORAL at 08:48

## 2018-07-11 RX ADMIN — Medication 500 MG: at 08:49

## 2018-07-11 RX ADMIN — MONTELUKAST SODIUM 10 MG: 10 TABLET, FILM COATED ORAL at 20:25

## 2018-07-11 RX ADMIN — THEOPHYLLINE 300 MG: 300 TABLET, EXTENDED RELEASE ORAL at 08:49

## 2018-07-11 NOTE — PROGRESS NOTES
Problem: Mobility Impaired (Adult and Pediatric)  Goal: *Acute Goals and Plan of Care (Insert Text)  GOALS (1-4 days):  (1.)Ms. Tami Miranda will move from supine to sit and sit to supine  in bed with SUPERVISION. (2.)Ms. Tami Miranda will transfer from bed to chair and chair to bed with STAND BY ASSIST using the least restrictive device. (3.)Ms. Tami Miranda will ambulate with STAND BY ASSIST for  feet with the least restrictive device. (4.)Ms. Tami Miranda will ambulate up/down 1 steps with No railing with MINIMAL ASSIST with walker. (5.)Ms. Tami Miranda will increase right knee ROM to 5°-80°.  ________________________________________________________________________________________________    PHYSICAL THERAPY Joint camp tKa: Daily Note, Treatment Day: 2nd, AM 7/11/2018  INPATIENT: Hospital Day: 3  [place] / Product Type: Medicare /      NAME/AGE/GENDER: Nelly Vinson is a 80 y.o. female   PRIMARY DIAGNOSIS:  Osteoarthritis of right knee, unspecified osteoarthritis type [M17.11]   Procedure(s) and Anesthesia Type:     * RIGHT KNEE ARTHROPLASTY TOTAL SOUMYA CEMENTED / ANCEF 2gm / Sergio Karl - Spinal (Right)  ICD-10: Treatment Diagnosis:    · Pain in Right Knee (M25.561)  · Stiffness of Right Knee, Not elsewhere classified (M25.661)  · Difficulty in walking, Not elsewhere classified (R26.2)      ASSESSMENT:     Ms. Tami Miranda presents with impaired strength & mobility s/p right TKA. Pt also had decreased stability during out of bed activity. Pt will benefit from follow up therapy to help restore safe function. Pt. Reports increased pain in her knee today. She plans on going to rehab tomorrow. She did tka exercises with cues and assistance. She was able to take a few steps with walker but gait distance limited by pain this am.  She was left in the chair with no complaints.     This section established at most recent assessment   PROBLEM LIST (Impairments causing functional limitations):  1. Decreased Strength  2. Decreased ADL/Functional Activities  3. Decreased Transfer Abilities  4. Decreased Ambulation Ability/Technique  5. Decreased Balance  6. Increased Pain  7. Decreased Activity Tolerance  8. Decreased Flexibility/Joint Mobility  9. Decreased Alexander with Home Exercise Program   INTERVENTIONS PLANNED: (Benefits and precautions of physical therapy have been discussed with the patient.)  1. Bed Mobility  2. Gait Training  3. Home Exercise Program (HEP)  4. Therapeutic Exercise/Strengthening  5. Transfer Training  6. Range of Motion: active/assisted/passive  7. Therapeutic Activities  8. Group Therapy     TREATMENT PLAN: Frequency/Duration: Follow patient BID for duration of hospital stay to address above goals. Rehabilitation Potential For Stated Goals: Good     RECOMMENDED REHABILITATION/EQUIPMENT: (at time of discharge pending progress): Continue Skilled Therapy and Rehab. HISTORY:   History of Present Injury/Illness (Reason for Referral): The patient has end stage arthritis of the right knee. The patient was evaluated and examined during a consultation prior to today. The patient complains of knee pain with activities, reports pain as mostly occurring along the joint lines, reports stiffness of the knee with prolonged inactivity, and swelling/pain at the end of the day and after increased physical activity. The pain affects the patients activities of daily living and quality of life. The patient has attempted and exhausted conservative treatment. There have been no changes to the patient's orthopedic condition since the last office visit.     Past Medical History/Comorbidities:   Ms. Rudy Yip  has a past medical history of Arthritis; Asthma; Breast CA ([facility] [address] Cardiac arrhythmia (03/2013); Chronic obstructive pulmonary disease ([facility] [address] Chronic pain; Former smoker; GERD (gastroesophageal reflux disease); Heart failure ([facility] [address]  Heart murmur; High cholesterol; Hypertension; Incontinence in female; Migraine; Mild aortic stenosis; Oxygen dependent; and Thyroid disease. She also has no past medical history of Adverse effect of anesthesia; Aneurysm ([facility] [address] Autoimmune disease ([facility] [address] CAD (coronary artery disease); Chronic kidney disease; Coagulation disorder ([facility] [address] Diabetes ([facility] [address] Difficult intubation; Endocarditis; Ill-defined condition; Liver disease; Malignant hyperthermia due to anesthesia; Morbid obesity ([facility] [address] Nausea & vomiting; Nicotine vapor product user; Non-nicotine vapor product user; Pseudocholinesterase deficiency; Psychiatric disorder; PUD (peptic ulcer disease); Rheumatic fever; Seizures ([facility] [address] Sleep apnea; Stroke [facility]); or Thromboembolus ([facility] [address] Ms. Meagan Braden  has a past surgical history that includes hx hysterectomy; hx appendectomy; hx splenectomy (1998); hx cholecystectomy; hx breast lumpectomy (Right, 10/31/2012); hx open reduction internal fixation (Right, 05/31/2018); hx other surgical; and hx knee arthroscopy (Right). Social History/Living Environment:   Home Environment: Private residence  # Steps to Enter: 1  Rails to Enter: No  One/Two Story Residence: One story  Living Alone: No  Support Systems: Family member(s)  Patient Expects to be Discharged to[de-identified] Skilled nursing facility  Current DME Used/Available at Home: None  Tub or Shower Type: Tub/Shower combination  Prior Level of Function/Work/Activity:  Pt was non-ambulatory for the last 6 weeks, but the last 2 weeks of this 6 week period prior to this admission, this pt was needing SBA for transfers only.    Number of Personal Factors/Comorbidities that affect the Plan of Care: 3+: HIGH COMPLEXITY   EXAMINATION:   Most Recent Physical Functioning:               RLE AROM  R Knee Flexion: 98  R Knee Extension: 4                 Transfers  Sit to Stand: Minimum assistance  Stand to Sit: Minimum assistance    Balance  Sitting: Intact  Standing: With support;Pull to stand              Weight Bearing Status  Right Side Weight Bearing: As tolerated  Distance (ft): 10 Feet (ft)  Ambulation - Level of Assistance: Minimal assistance  Assistive Device: Walker, rolling  Speed/Alissa: Delayed  Step Length: Left shortened;Right shortened  Stance: Right decreased  Gait Abnormalities: Antalgic  Interventions: Safety awareness training;Verbal cues     Braces/Orthotics: none    Right Knee Cold  Type: Cryocuff      Body Structures Involved:  1. Joints  2. Muscles Body Functions Affected:  1. Sensory/Pain  2. Movement Related Activities and Participation Affected:  1. General Tasks and Demands  2. Mobility   Number of elements that affect the Plan of Care: 3: MODERATE COMPLEXITY   CLINICAL PRESENTATION:   Presentation: Evolving clinical presentation with unstable and unpredictable characteristics: HIGH COMPLEXITY   CLINICAL DECISION MAKIN \[facility]\"" [id number] AM-PAC 6 Clicks   Basic Mobility Inpatient Short Form  How much difficulty does the patient currently have. .. Unable A Lot A Little None   1. Turning over in bed (including adjusting bedclothes, sheets and blankets)? [] 1   [] 2   [x] 3   [] 4   2. Sitting down on and standing up from a chair with arms ( e.g., wheelchair, bedside commode, etc.)   [] 1   [] 2   [x] 3   [] 4   3. Moving from lying on back to sitting on the side of the bed? [] 1   [] 2   [x] 3   [] 4   How much help from another person does the patient currently need. .. Total A Lot A Little None   4. Moving to and from a bed to a chair (including a wheelchair)? [] 1   [] 2   [x] 3   [] 4   5. Need to walk in hospital room? [] 1   [] 2   [x] 3   [] 4   6. Climbing 3-5 steps with a railing? [x] 1   [] 2   [] 3   [] 4   © 2007, Trustees of [address] [address], under license to [employer]. All rights reserved        Score:  Initial: 16 Most Recent: X (Date: -- )    Interpretation of Tool:  Represents activities that are increasingly more difficult (i.e. Bed mobility, Transfers, Gait). Score 24 23 22-20 19-15 14-10 9-7 6     Modifier CH CI CJ CK CL CM CN      ? Mobility - Walking and Moving Around:     - CURRENT STATUS: CK - 40%-59% impaired, limited or restricted    - GOAL STATUS: CJ - 20%-39% impaired, limited or restricted    - D/C STATUS:  ---------------To be determined---------------  [place] / Product Type: Medicare /      Medical Necessity:     · Patient is expected to demonstrate progress in strength, range of motion, balance, coordination and functional technique to decrease assistance required with bed mobility, transfers & gait. Reason for Services/Other Comments:  · Patient continues to require skilled intervention due to pt unsafe with functional mobility. Use of outcome tool(s) and clinical judgement create a POC that gives a: Difficult prediction of patient's progress: HIGH COMPLEXITY            TREATMENT:   (In addition to Assessment/Re-Assessment sessions the following treatments were rendered)     Pre-treatment Symptoms/Complaints:  Knee pain  Pain Initial:      Post Session:  None at rest, pain with activity and gait, not rated     Therapeutic Exercise: (30 Minutes):  Exercises per grid below to improve mobility and dynamic movement of leg - right to improve functional endurance. Required minimal verbal cues to promote proper body alignment and promote proper body mechanics. Progressed range and repetitions as indicated. Gait Training (15 Minutes):  Gait training to improve and/or restore physical functioning as related to mobility and strength. Ambulated 10 Feet (ft) with Minimal assistance using a Walker, rolling and minimal Safety awareness training;Verbal cues related to their stance phase and stride length to promote proper body alignment and promote proper body mechanics. Instruction in performance of walker use and gait sequencing to correct stance phase and stride length.        Date:  7/9 Date:  7/10/18 Date:  7/11   ACTIVITY/EXERCISE AM PM AM PM AM PM   GROUP THERAPY  []  []  []  [x]  [x]  []   Ankle Pumps  10 10 15 20a    Quad Sets  10 10 15 20a    Gluteal Sets  10 10 15 20a    Hip ABd/ADduction  10 15 15 20a    Straight Leg Raises  10 15 15 20aa    Knee Slides  10 15 15 20aa    Short Arc Quads  10  13 20aa    Long Arc Quads         Chair Slides    15 20aa             B = bilateral; AA = active assistive; A = active; P = passive      Treatment/Session Assessment:     Response to Treatment:  Pt. Not moving as well this am due to pain    Education:  [x] Home Exercises  [x] Fall Precautions  [x] no pillow under knee [x] D/C Instruction Review  [x] Knee Prosthesis Review  [x] Walker Management/Safety [] Adaptive Equipment as Needed       Interdisciplinary Collaboration:   o Physical Therapist  o Rehabilitation Attendant    After treatment position/precautions:   o Up in chair  o Bed/Chair-wheels locked  o Bed in low position  o Call light within reach    Compliance with Program/Exercises: compliant all the time. Recommendations/Intent for next treatment session:  Treatment next visit will focus on increasing Ms. Vila's independence with bed mobility, transfers, gait training, strength/ROM exercises, modalities for pain, and patient education.       Total Treatment Duration:  PT Patient Time In/Time Out  Time In: 1030  Time Out: 10 Joselyn Cash, PT

## 2018-07-11 NOTE — PROGRESS NOTES
Problem: Mobility Impaired (Adult and Pediatric)  Goal: *Acute Goals and Plan of Care (Insert Text)  GOALS (1-4 days):  (1.)Ms. Wilma Nava will move from supine to sit and sit to supine  in bed with SUPERVISION. (2.)Ms. Wilma Nava will transfer from bed to chair and chair to bed with STAND BY ASSIST using the least restrictive device. (3.)Ms. Wilma Nava will ambulate with STAND BY ASSIST for  feet with the least restrictive device. (4.)Ms. Wilma Nava will ambulate up/down 1 steps with No railing with MINIMAL ASSIST with walker. (5.)Ms. Wilma Nava will increase right knee ROM to 5°-80°.  ________________________________________________________________________________________________    PHYSICAL THERAPY Joint camp tKa: Daily Note, Treatment Day: 2nd, PM 7/11/2018  INPATIENT: Hospital Day: 3  [place] / Product Type: Medicare /      NAME/AGE/GENDER: Karlee Garcia is a 80 y.o. female   PRIMARY DIAGNOSIS:  Osteoarthritis of right knee, unspecified osteoarthritis type [M17.11]   Procedure(s) and Anesthesia Type:     * RIGHT KNEE ARTHROPLASTY TOTAL SOUMYA CEMENTED / ANCEF 2gm / Adiel Emy - Spinal (Right)  ICD-10: Treatment Diagnosis:    · Pain in Right Knee (M25.561)  · Stiffness of Right Knee, Not elsewhere classified (M25.661)  · Difficulty in walking, Not elsewhere classified (R26.2)      ASSESSMENT:     Ms. Wilma Nava presents with impaired strength & mobility s/p right TKA. Pt also had decreased stability during out of bed activity. Pt will benefit from follow up therapy to help restore safe function. Pt. Reports that she took a nap for an hour. Rates pain 8/10 in the knee. She agreed to tka exercises, did well with them. She ambulated a little further this afternoon, distance limited by pain. No questions. Plans on rehab tomorrow. She was left in the chair with no complaints.     This section established at most recent assessment   PROBLEM LIST (Impairments causing functional limitations):  1. Decreased Strength  2. Decreased ADL/Functional Activities  3. Decreased Transfer Abilities  4. Decreased Ambulation Ability/Technique  5. Decreased Balance  6. Increased Pain  7. Decreased Activity Tolerance  8. Decreased Flexibility/Joint Mobility  9. Decreased Coffee with Home Exercise Program   INTERVENTIONS PLANNED: (Benefits and precautions of physical therapy have been discussed with the patient.)  1. Bed Mobility  2. Gait Training  3. Home Exercise Program (HEP)  4. Therapeutic Exercise/Strengthening  5. Transfer Training  6. Range of Motion: active/assisted/passive  7. Therapeutic Activities  8. Group Therapy     TREATMENT PLAN: Frequency/Duration: Follow patient BID for duration of hospital stay to address above goals. Rehabilitation Potential For Stated Goals: Good     RECOMMENDED REHABILITATION/EQUIPMENT: (at time of discharge pending progress): Continue Skilled Therapy and Rehab. HISTORY:   History of Present Injury/Illness (Reason for Referral): The patient has end stage arthritis of the right knee. The patient was evaluated and examined during a consultation prior to today. The patient complains of knee pain with activities, reports pain as mostly occurring along the joint lines, reports stiffness of the knee with prolonged inactivity, and swelling/pain at the end of the day and after increased physical activity. The pain affects the patients activities of daily living and quality of life. The patient has attempted and exhausted conservative treatment. There have been no changes to the patient's orthopedic condition since the last office visit.     Past Medical History/Comorbidities:   Ms. Noreen Tellez  has a past medical history of Arthritis; Asthma; Breast CA ([facility] [address] Cardiac arrhythmia (03/2013); Chronic obstructive pulmonary disease ([facility] [address] Chronic pain; Former smoker; GERD (gastroesophageal reflux disease); Heart failure ([facility] [address]  Heart murmur; High cholesterol; Hypertension; Incontinence in female; Migraine; Mild aortic stenosis; Oxygen dependent; and Thyroid disease. She also has no past medical history of Adverse effect of anesthesia; Aneurysm ([facility] [address] Autoimmune disease ([facility] [address] CAD (coronary artery disease); Chronic kidney disease; Coagulation disorder ([facility] [address] Diabetes ([facility] [address] Difficult intubation; Endocarditis; Ill-defined condition; Liver disease; Malignant hyperthermia due to anesthesia; Morbid obesity ([facility] [address] Nausea & vomiting; Nicotine vapor product user; Non-nicotine vapor product user; Pseudocholinesterase deficiency; Psychiatric disorder; PUD (peptic ulcer disease); Rheumatic fever; Seizures ([facility] [address] Sleep apnea; Stroke [facility]); or Thromboembolus ([facility] [address] Ms. Regino Porter  has a past surgical history that includes hx hysterectomy; hx appendectomy; hx splenectomy (1998); hx cholecystectomy; hx breast lumpectomy (Right, 10/31/2012); hx open reduction internal fixation (Right, 05/31/2018); hx other surgical; and hx knee arthroscopy (Right). Social History/Living Environment:   Home Environment: Private residence  # Steps to Enter: 1  Rails to Enter: No  One/Two Story Residence: One story  Living Alone: No  Support Systems: Family member(s)  Patient Expects to be Discharged to[de-identified] Skilled nursing facility  Current DME Used/Available at Home: None  Tub or Shower Type: Tub/Shower combination  Prior Level of Function/Work/Activity:  Pt was non-ambulatory for the last 6 weeks, but the last 2 weeks of this 6 week period prior to this admission, this pt was needing SBA for transfers only.    Number of Personal Factors/Comorbidities that affect the Plan of Care: 3+: HIGH COMPLEXITY   EXAMINATION:   Most Recent Physical Functioning:               RLE AROM  R Knee Flexion: 98  R Knee Extension: 4                 Transfers  Sit to Stand: Contact guard assistance  Stand to Sit: Contact guard assistance    Balance  Sitting: Intact  Standing: With support              Weight Bearing Status  Right Side Weight Bearing: As tolerated  Distance (ft): 25 Feet (ft)  Ambulation - Level of Assistance: Contact guard assistance  Assistive Device: Walker, rolling  Speed/Alissa: Delayed  Step Length: Left shortened;Right shortened  Stance: Right decreased  Gait Abnormalities: Antalgic  Interventions: Safety awareness training;Verbal cues     Braces/Orthotics: none    Right Knee Cold  Type: Cryocuff      Body Structures Involved:  1. Joints  2. Muscles Body Functions Affected:  1. Sensory/Pain  2. Movement Related Activities and Participation Affected:  1. General Tasks and Demands  2. Mobility   Number of elements that affect the Plan of Care: 3: MODERATE COMPLEXITY   CLINICAL PRESENTATION:   Presentation: Evolving clinical presentation with unstable and unpredictable characteristics: HIGH COMPLEXITY   CLINICAL DECISION MAKIN [facility] [address] AM-PAC 6 Clicks   Basic Mobility Inpatient Short Form  How much difficulty does the patient currently have. .. Unable A Lot A Little None   1. Turning over in bed (including adjusting bedclothes, sheets and blankets)? [] 1   [] 2   [x] 3   [] 4   2. Sitting down on and standing up from a chair with arms ( e.g., wheelchair, bedside commode, etc.)   [] 1   [] 2   [x] 3   [] 4   3. Moving from lying on back to sitting on the side of the bed? [] 1   [] 2   [x] 3   [] 4   How much help from another person does the patient currently need. .. Total A Lot A Little None   4. Moving to and from a bed to a chair (including a wheelchair)? [] 1   [] 2   [x] 3   [] 4   5. Need to walk in hospital room? [] 1   [] 2   [x] 3   [] 4   6. Climbing 3-5 steps with a railing? [x] 1   [] 2   [] 3   [] 4   © , Trustees of [address] [place] [address] [address] [address], under license to [employer].  All rights reserved        Score:  Initial: 16 Most Recent: X (Date: -- )    Interpretation of Tool:  Represents activities that are increasingly more difficult (i.e. Bed mobility, Transfers, Gait).   Score 24 23 22-20 19-15 14-10 9-7 6     Modifier CH CI CJ CK CL CM CN      ? Mobility - Walking and Moving Around:     - CURRENT STATUS: CK - 40%-59% impaired, limited or restricted    - GOAL STATUS: CJ - 20%-39% impaired, limited or restricted    - D/C STATUS:  ---------------To be determined---------------  [place] / Product Type: Medicare /      Medical Necessity:     · Patient is expected to demonstrate progress in strength, range of motion, balance, coordination and functional technique to decrease assistance required with bed mobility, transfers & gait. Reason for Services/Other Comments:  · Patient continues to require skilled intervention due to pt unsafe with functional mobility. Use of outcome tool(s) and clinical judgement create a POC that gives a: Difficult prediction of patient's progress: HIGH COMPLEXITY            TREATMENT:   (In addition to Assessment/Re-Assessment sessions the following treatments were rendered)     Pre-treatment Symptoms/Complaints:  Knee pain  Pain Initial:      Post Session:  8 with activity     Therapeutic Exercise: (20 Minutes):  Exercises per grid below to improve mobility and dynamic movement of leg - right to improve functional endurance. Required minimal verbal cues to promote proper body alignment and promote proper body mechanics. Progressed range and repetitions as indicated. Gait Training (10 Minutes):  Gait training to improve and/or restore physical functioning as related to mobility and strength. Ambulated 25 Feet (ft) with Contact guard assistance using a Walker, rolling and minimal Safety awareness training;Verbal cues related to their stance phase and stride length to promote proper body alignment and promote proper body mechanics. Instruction in performance of walker use and gait sequencing to correct stance phase and stride length.        Date:  7/9 Date:  7/10/18 Date:  7/11 ACTIVITY/EXERCISE AM PM AM PM AM PM   GROUP THERAPY  []  []  []  [x]  [x]  []   Ankle Pumps  10 10 15 20a 20a   Quad Sets  10 10 15 20a 20a   Gluteal Sets  10 10 15 20a 20a   Hip ABd/ADduction  10 15 15 20a 20a   Straight Leg Raises  10 15 15 20aa 20a   Knee Slides  10 15 15 20aa 20aa   Short Arc Quads  10  15 20aa 20aa   Long Arc Quads         Chair Slides    15 20aa 20a            B = bilateral; AA = active assistive; A = active; P = passive      Treatment/Session Assessment:     Response to Treatment:  Pt. Did a little better this afternoon. Education:  [x] Home Exercises  [x] Fall Precautions  [x] no pillow under knee [x] D/C Instruction Review  [x] Knee Prosthesis Review  [x] Walker Management/Safety [] Adaptive Equipment as Needed       Interdisciplinary Collaboration:   o Physical Therapist    After treatment position/precautions:   o Up in chair  o Bed/Chair-wheels locked  o Bed in low position  o Call light within reach    Compliance with Program/Exercises: compliant all the time. Recommendations/Intent for next treatment session:  Treatment next visit will focus on increasing Ms. Vila's independence with bed mobility, transfers, gait training, strength/ROM exercises, modalities for pain, and patient education.       Total Treatment Duration:  PT Patient Time In/Time Out  Time In: 1420  Time Out: Madi Bray 134, PT

## 2018-07-11 NOTE — PROGRESS NOTES
2018         Post Op day: 2 Days Post-Op     Admit Date: 2018  Admit Diagnosis: Osteoarthritis of right knee, unspecified osteoarthritis type [M17.11]        Subjective: Patient stable. No acute events. Objective:   Visit Vitals    /90 (BP 1 Location: Right arm, BP Patient Position: At rest)    Pulse 74    Temp 98.5 °F (36.9 °C)    Resp 16    SpO2 98%    Temp (24hrs), Av.6 °F (36.4 °C), Min:95.4 °F (35.2 °C), Max:98.5 °F (36.9 °C)    Lab Results   Component Value Date/Time    HGB 10.2 (L) 2018 05:14 AM     Extremity Exam  Dressing clean and dry   Tibialis Anterior and Gastroc-Soleus functioning normally right lower extremity  Sensation intact to light touch on operative limb  Extremity perfused  TEDS/SCDS in place  No sign of DVT     Assessment / Plan :  WBAT RLE  Continue PT/OT  Continue current DVT prophylaxis in house. Discharge on ASA BID  DIspo-SNF  Patient Active Problem List   Diagnosis Code    Simple chronic bronchitis (HCC) J41.0    HTN (hypertension) I10    Pure hypercholesterolemia E78.00    History of breast cancer Z85.3    Osteoporosis M81.0    Memory loss R41.3    Hallucinations, visual R44.1    Osteoarthritis of both knees M17.0    Chronic kidney disease, stage III (moderate) N18.3    H/O splenectomy Z90.81    Chronic pain syndrome G89.4    Congestive heart failure (HCC) I50.9    Nonsustained ventricular tachycardia (HCC) I47.2    Abnormal urinalysis R82.90    Baseline forced expiratory volume at end of one second (FEV1) 30% to 80% predicted Z78.9    OA (osteoarthritis) of knee M17.10          Signed By: Odette Rawls MD     I have reviewed the patients controlled substance prescription history, as maintained in the [place] prescription monitoring program, so that the prescription(s) for a  controlled substance can be given.

## 2018-07-11 NOTE — PROGRESS NOTES
Pt resting well in bed with bed alarm set and on. Call light in reach and pt door open to nsg station. Uneventful shift. Pain to knee controlled. Pt voiding well. Appetite good. D/c plans in progress for Rehab.

## 2018-07-12 VITALS
HEART RATE: 66 BPM | RESPIRATION RATE: 16 BRPM | SYSTOLIC BLOOD PRESSURE: 155 MMHG | DIASTOLIC BLOOD PRESSURE: 80 MMHG | TEMPERATURE: 97.9 F | OXYGEN SATURATION: 95 %

## 2018-07-12 LAB — HGB BLD-MCNC: 10.4 G/DL (ref 11.7–15.4)

## 2018-07-12 PROCEDURE — 74011250637 HC RX REV CODE- 250/637: Performed by: ORTHOPAEDIC SURGERY

## 2018-07-12 PROCEDURE — 74011000250 HC RX REV CODE- 250: Performed by: ORTHOPAEDIC SURGERY

## 2018-07-12 PROCEDURE — 97110 THERAPEUTIC EXERCISES: CPT

## 2018-07-12 PROCEDURE — 97116 GAIT TRAINING THERAPY: CPT

## 2018-07-12 PROCEDURE — 85018 HEMOGLOBIN: CPT | Performed by: ORTHOPAEDIC SURGERY

## 2018-07-12 PROCEDURE — 36415 COLL VENOUS BLD VENIPUNCTURE: CPT | Performed by: ORTHOPAEDIC SURGERY

## 2018-07-12 PROCEDURE — 94640 AIRWAY INHALATION TREATMENT: CPT

## 2018-07-12 PROCEDURE — 94760 N-INVAS EAR/PLS OXIMETRY 1: CPT

## 2018-07-12 PROCEDURE — 99239 HOSP IP/OBS DSCHRG MGMT >30: CPT | Performed by: PHYSICAL MEDICINE & REHABILITATION

## 2018-07-12 RX ADMIN — FUROSEMIDE 20 MG: 20 TABLET ORAL at 08:54

## 2018-07-12 RX ADMIN — LISINOPRIL 10 MG: 5 TABLET ORAL at 08:54

## 2018-07-12 RX ADMIN — Medication 400 MG: at 08:53

## 2018-07-12 RX ADMIN — SENNOSIDES AND DOCUSATE SODIUM 2 TABLET: 8.6; 5 TABLET ORAL at 08:54

## 2018-07-12 RX ADMIN — HYDROCODONE BITARTRATE AND ACETAMINOPHEN 1 TABLET: 7.5; 325 TABLET ORAL at 02:18

## 2018-07-12 RX ADMIN — ALBUTEROL SULFATE 2.5 MG: 2.5 SOLUTION RESPIRATORY (INHALATION) at 12:38

## 2018-07-12 RX ADMIN — ASPIRIN 81 MG: 81 TABLET, COATED ORAL at 08:53

## 2018-07-12 RX ADMIN — HYDROCODONE BITARTRATE AND ACETAMINOPHEN 1 TABLET: 7.5; 325 TABLET ORAL at 13:15

## 2018-07-12 RX ADMIN — Medication 500 MG: at 08:54

## 2018-07-12 RX ADMIN — THEOPHYLLINE 300 MG: 300 TABLET, EXTENDED RELEASE ORAL at 08:53

## 2018-07-12 RX ADMIN — VITAMIN D, TAB 1000IU (100/BT) 1000 UNITS: 25 TAB at 08:54

## 2018-07-12 RX ADMIN — METOPROLOL TARTRATE 25 MG: 25 TABLET ORAL at 08:54

## 2018-07-12 RX ADMIN — HYDROCODONE BITARTRATE AND ACETAMINOPHEN 1 TABLET: 7.5; 325 TABLET ORAL at 06:24

## 2018-07-12 RX ADMIN — LEVOTHYROXINE SODIUM 50 MCG: 50 TABLET ORAL at 06:24

## 2018-07-12 RX ADMIN — ALBUTEROL SULFATE 2.5 MG: 2.5 SOLUTION RESPIRATORY (INHALATION) at 07:51

## 2018-07-12 NOTE — PROGRESS NOTES
07/12/18 0751   Oxygen Therapy   O2 Sat (%) 93 %   Pulse via Oximetry 75 beats per minute   O2 Device Room air   Pre-Treatment   Breathing Pattern Regular   Cough Non-productive   Breath Sounds Bilateral Clear   Pulse 75   SpO2 93 %   Respirations 15   Post-Treatment   Breathing Pattern Regular   Cough Non-productive   Breath Sounds Bilateral Clear   Pulse 76   SpO2 96 %   Respirations 17   Treatment Tolerance Well   Procedures   $$ Subsequent Procedure Aerosol   Delivery Source Mask   Aerosolized Medications Albuterol   Incentive Spirometry Treatment   Actual Volume (ml) 1200 ml   Number of Attempts 3   Patient achieved  1200   Ml/sec on IS. Patient encouraged to do 10 breaths every hour while awake-patient agreed and demonstrated. No shortness of breath or distress noted. BS are clear b/l.

## 2018-07-12 NOTE — DISCHARGE SUMMARY
REHABILITATION DISCHARGE SUMMARY     Patient: Chandrika Abad MRN: [id number]  SSN: [id number]    YOB: 1925  Age: 80 y.o. Sex: female      Date: 7/12/2018  Admit Date: 7/9/2018  Discharge Date: 7/12/2018    Admitting Physician: Krzysztof Hernandez MD   Primary Care Physician: Fortunato Lindo MD     Admission Condition: good    Chief Complaint : Gait dysfunction secondary to below. Admit Diagnosis: Osteoarthritis of right knee, unspecified osteoarthritis ty*  right total knee arthroplasty 7/9/2018  Pain  DVT risk  Post op hemorrhagic anemia  Congestive heart failure (HCC) (3/14/2017)  OA (osteoarthritis) of knee (7/9/2018)  Hypertension  Chronic obstructive pulmonary disease   Acute Rehab Dx:  Gait impairment  Mobility and ambulation deficits  Self Care/ADL deficits     HPI: Chandrika Abad is a 80 y.o. female patient at [address] [place] [address] who was admitted on 7/9/2018  by Krzysztof Hernandez MD with below mentioned medical history, is being seen for Physical Medicine and Rehabilitation. Chandrika Abad had right knee pain and discomfort with walking and activity due to end stage DJD. The symptoms were not well controlled with conservative management and has limited the patient's function. Patient underwent a right total knee arthroplasty per Dr. Krzysztof Hernandez MD on 7/9/2018. The patient's post operative course has been uncomplicated. Patient's weight bearing status is to be WBAT RLE. Patient is starting to stand, taking steps with acute PT and OT. Patient still shows significant functional deficits due to right knee pain, decreased ROM and strength. Chandrika Abad is seen and examined today. Medical Records reviewed.   Patient states she was independent but recently unable to walk due to pain.          Rehabiitation Course:   Functional  Level On Admission: Walk: Moderate Childs  Functional Level At Discharge: Walk: Contact [address] Environment: Private residence (07/09/18 1500)  # Steps to Enter: 1 (07/09/18 1500)  Rails to Enter: No (07/09/18 1500)  One/Two Story Residence: One story (07/09/18 1500)  Living Alone: No (07/09/18 1500)  Support Systems: Family member(s) (07/09/18 1500)  Patient Expects to be Discharged to[de-identified] Skilled nursing facility (07/09/18 1500)  Current DME Used/Available at Home: None (07/09/18 1500)  Tub or Shower Type: Tub/Shower combination (07/09/18 1500)     Past Medical History:   Diagnosis Date    Arthritis     Asthma     mild    Breast CA ([facility] [address]     rt- lumpectomy, radiation    Cardiac arrhythmia 03/2013    a-fib    Chronic obstructive pulmonary disease (HCC)     prn inhaler; on 2L/NC at bedtime     Chronic pain     right knee    Former smoker     GERD (gastroesophageal reflux disease)     controlled with med     Heart failure ([address]     Heart murmur     last echo 10/14/16    High cholesterol     Hypertension     on med for control     Incontinence in female     Migraine     Mild aortic stenosis     Oxygen dependent     mainly at night 2L/NC    Thyroid disease     hypo      Past Surgical History:   Procedure Laterality Date    HX APPENDECTOMY      HX BREAST LUMPECTOMY Right 10/31/2012    HX CHOLECYSTECTOMY      HX HYSTERECTOMY      HX KNEE ARTHROSCOPY Right     HX OPEN REDUCTION INTERNAL FIXATION Right 05/31/2018    RIGHT DISTAL RADIUS     HX OTHER SURGICAL      colpocleisis     HX SPLENECTOMY  1998      Family History   Problem Relation Age of Onset    Cancer Mother       Social History   Substance Use Topics    Smoking status: Former Smoker     Packs/day: 1.00     Years: 45.00     Types: Cigarettes     Quit date: 8/1/1992    Smokeless tobacco: Never Used    Alcohol use No       Allergies   Allergen Reactions    Codeine Itching    Phenobarbital Unknown (comments)    Tramadol Itching       Prior to Admission medications    Medication Sig Start Date End Date Taking?  Authorizing Provider aspirin delayed-release 81 mg tablet Take 1 Tab by mouth every twelve (12) hours every twelve (12) hours. 7/9/18  Yes Juanito Thomas MD   acetaminophen (TYLENOL) 500 mg tablet Take 2 Tabs by mouth every six (6) hours. 7/10/18  Yes Juanito Thomas MD   HYDROcodone-acetaminophen (NORCO) 7.5-325 mg per tablet Take 1 Tab by mouth every four (4) hours as needed. Max Daily Amount: 6 Tabs. 7/9/18  Yes Juanito Thomas MD   ondansetron (ZOFRAN ODT) 8 mg disintegrating tablet Take 1 Tab by mouth every eight (8) hours as needed for Nausea. 7/9/18  Yes Juanito Thomas MD   senna-docusate (PERICOLACE) 8.6-50 mg per tablet Take 2 Tabs by mouth daily. 7/10/18  Yes Juanito Thomas MD   mupirocin calcium (BACTROBAN) 2 % nasal ointment by Both Nostrils route two (2) times a day. Yes Historical Provider   aspirin delayed-release 81 mg tablet Take 81 mg by mouth every other day. Take / use AM day of surgery  per anesthesia protocols if due   Yes Historical Provider   ALBUTEROL IN by Nebulization route daily as needed. Take / use AM day of surgery  per anesthesia protocols if needed. Yes Historical Provider   theophylline ER,12 hour, (THEOCHRON) 300 mg tablet Take 300 mg by mouth two (2) times a day. Take / use AM day of surgery  per anesthesia protocols. Yes Historical Provider   montelukast (SINGULAIR) 10 mg tablet Take 1 Tab by mouth nightly. 12/7/17  Yes Barb Chase MD   atorvastatin (LIPITOR) 20 mg tablet Take 1 Tab by mouth daily. Patient taking differently: Take 20 mg by mouth nightly. 12/7/17  Yes Barb Chase MD   LORazepam (ATIVAN) 0.5 mg tablet Take 1 Tab by mouth nightly. Max Daily Amount: 0.5 mg. Patient taking differently: Take 0.5 mg by mouth daily (after dinner). 12/7/17  Yes Barb Chase MD   omeprazole (PRILOSEC) 20 mg capsule Take 1 Cap by mouth daily. 12/7/17  Yes Barb Chase MD   levothyroxine (SYNTHROID) 50 mcg tablet Take 1 Tab by mouth Daily (before breakfast).  12/7/17  Yes Alvina Ferguson Adan Solis MD   lisinopril (PRINIVIL, ZESTRIL) 10 mg tablet Take 1 Tab by mouth daily. 12/7/17  Yes Rochelle Vazquez MD   furosemide (LASIX) 20 mg tablet Take 1 tablet daily, alternate with 1/2 tablet daily. Patient taking differently: 20 mg daily. Take 1 tablet daily, alternate with 1/2 tablet daily. 12/7/17  Yes Rochelle Vazquez MD   metoprolol tartrate (LOPRESSOR) 25 mg tablet Take 1 Tab by mouth two (2) times a day. 11/10/17  Yes Rochelle Vazquez MD   magnesium 250 mg tab Take 250 mg by mouth daily. Yes Historical Provider   calcium carbonate (OS-EMERITA) 500 mg calcium (1,250 mg) tablet Take 1 Tab by mouth two (2) times a day. Yes Historical Provider   VITAMIN E, DL,TOCOPHERYL ACET, (VITAMIN E ACETATE) 400 unit cap capsule Take 400 Units by mouth daily. Yes Historical Provider   Cholecalciferol, Vitamin D3, (VITAMIN D3) 1,000 unit cap Take 1 Tab by mouth daily. Yes Historical Provider   albuterol (VENTOLIN HFA) 90 mcg/actuation inhaler Take 2 Puffs by inhalation every four (4) hours as needed for Wheezing or Shortness of Breath. Take / use AM day of surgery  per anesthesia protocols if needed. Indications: Acute Asthma Attack, Chronic Obstructive Pulmonary Disease   Yes Historical Provider   fluticasone-vilanterol (BREO ELLIPTA) 200-25 mcg/dose inhaler Take 1 Puff by inhalation daily. RINSE MOUTH WELL AFTER USE 7/5/18   Romulo Avendaño NP   denosumab (PROLIA) 60 mg/mL injection 1 mL by SubCUTAneous route once for 1 dose. Every 6 months, last shot 6/9/16        May draw labs if needed     Original order is good for 6 mths  Indications: POST-MENOPAUSAL OSTEOPOROSIS  Patient taking differently: 60 mg by SubCUTAneous route once.  Every 6 months, last shot 04/2018      May draw labs if needed     Original order is good for 6 mths  Indications: POST-MENOPAUSAL OSTEOPOROSIS 6/8/17 6/26/18  Rochelle Vazquez MD       Current Medications:  Current Facility-Administered Medications   Medication Dose Route Frequency Provider Last Rate Last Dose    Omeprazole delayed release (PRILOSEC D/R) tablet 20 mg (Patient Supplied)  20 mg Oral DAILY Stanislaw Ford MD        atorvastatin (LIPITOR) tablet 20 mg  20 mg Oral QHS Stanislaw Ford MD   20 mg at 07/11/18 2025    calcium carbonate (OS-EMERITA) tablet 500 mg [elemental]  500 mg Oral BID Stanislaw Ford MD   500 mg at 07/12/18 0854    furosemide (LASIX) tablet 20 mg  20 mg Oral DAILY Stanislaw Ford MD   20 mg at 07/12/18 0854    levothyroxine (SYNTHROID) tablet 50 mcg  50 mcg Oral ACB Stanislaw Ford MD   50 mcg at 07/12/18 0624    lisinopril (PRINIVIL, ZESTRIL) tablet 10 mg  10 mg Oral DAILY Stanislaw Ford MD   10 mg at 07/12/18 0854    LORazepam (ATIVAN) tablet 0.5 mg  0.5 mg Oral PCD Stanislaw Ford MD   0.5 mg at 07/11/18 1615    metoprolol tartrate (LOPRESSOR) tablet 25 mg  25 mg Oral BID Stanislaw Ford MD   25 mg at 07/12/18 0854    montelukast (SINGULAIR) tablet 10 mg  10 mg Oral QHS Stanislaw Ford MD   10 mg at 07/11/18 2025    theophylline ER(12 hour) (THEOCHRON) tablet 300 mg  300 mg Oral BID Stanislaw Ford MD   300 mg at 07/12/18 0853    sodium chloride (NS) flush 5-10 mL  5-10 mL IntraVENous PRN Stanislaw Ford MD        acetaminophen (TYLENOL) tablet 1,000 mg  1,000 mg Oral Q6H Stanislaw Ford MD        naloxone [facility]) injection 0.2-0.4 mg  0.2-0.4 mg IntraVENous Q10MIN PRN Stnaislaw Ford MD        diphenhydrAMINE (BENADRYL) capsule 25 mg  25 mg Oral Q4H PRN Stanislaw Ford MD        aspirin delayed-release tablet 81 mg  81 mg Oral Q12H Stanislaw Ford MD   81 mg at 07/12/18 0853    HYDROmorphone (PF) (DILAUDID) injection 1 mg  1 mg IntraVENous Q3H PRN Stanislaw Ford MD        senna-docusate (PERICOLACE) 8.6-50 mg per tablet 2 Tab  2 Tab Oral DAILY Stanislaw Ford MD   2 Tab at 07/12/18 0854    ondansetron (ZOFRAN ODT) tablet 8 mg  8 mg Oral Q8H PRN Stanislaw Ford MD   8 mg at 07/11/18 1134    HYDROcodone-acetaminophen (NORCO) 7.5-325 mg per tablet 1 Tab  1 Tab Oral Q4H PRN Jesus Bee MD   1 Tab at 07/12/18 0624    albuterol (PROVENTIL VENTOLIN) nebulizer solution 2.5 mg  2.5 mg Nebulization Q4HWA RT Jesus Bee MD   2.5 mg at 07/12/18 0751    magnesium oxide (MAG-OX) tablet 400 mg  400 mg Oral DAILY Jesus Bee MD   400 mg at 07/12/18 0853    fluticasone-vilanterol (BREO ELLIPTA) 200mcg-25mcg/puff (Patient Supplied)  1 Puff Inhalation DAILY Jesus Bee MD   Stopped at 07/12/18 0900    cholecalciferol (VITAMIN D3) tablet 1,000 Units  1,000 Units Oral DAILY Jesus Bee MD   1,000 Units at 07/12/18 0854        Review of Systems: Denies chest pain, shortness of breath, cough, headache, visual problems, abdominal pain, dysurea, calf pain. Pertinent positives are as noted in the medical records and unremarkable otherwise. Vital Signs:   Patient Vitals for the past 8 hrs:   BP Temp Pulse Resp SpO2   07/12/18 0751 - - - - 93 %   07/12/18 0723 155/80 97.9 °F (36.6 °C) 66 16 94 %   07/12/18 0400 132/72 98.4 °F (36.9 °C) 66 - 98 %        Physical Exam:   General: Alert and age appropriately oriented. No acute cardio respiratory distress. HEENT: Normocephalic. Oral mucosa moist without cyanosis. Lungs: Clear to auscultation  bilaterally. Respiration even and unlabored   Heart: Regular rate and rhythm, S1, S2   No  murmurs, clicks, rub or gallops   Abdomen: Soft, non-tender, nondistended. Bowel sounds present   Genitourinary: defered   Neuromuscular:      Grossly no focal neurological deficits noted. L Ankle dorsiflexion 5/5   L Ankle plantarflexion 5/5  R Ankle dorsiflexion 5/5   R Ankle plantarflexion 5/5  No sensory deficits. Skin/extremity: No rashes, no erythema. Calves soft. Wound covered.      Skin Incision(s)/Wound(s):        Lab Review:   Recent Results (from the past 72 hour(s))   HEMOGLOBIN    Collection Time: 07/09/18  7:57 PM   Result Value Ref Range    HGB 11.2 (L) 11.7 - 15.4 g/dL   HEMOGLOBIN    Collection Time: 07/10/18  5:41 AM   Result Value Ref Range    HGB 10.0 (L) 11.7 - 80.7 g/dL   METABOLIC PANEL, BASIC    Collection Time: 07/10/18  5:41 AM   Result Value Ref Range    Sodium 136 136 - 145 mmol/L    Potassium 5.1 3.5 - 5.1 mmol/L    Chloride 104 98 - 107 mmol/L    CO2 21 21 - 32 mmol/L    Anion gap 11 7 - 16 mmol/L    Glucose 88 65 - 100 mg/dL    BUN 18 8 - 23 MG/DL    Creatinine 1.17 (H) 0.6 - 1.0 MG/DL    GFR est AA 56 (L) >60 ml/min/1.73m2    GFR est non-AA 46 (L) >60 ml/min/1.73m2    Calcium 8.3 8.3 - 10.4 MG/DL   HEMOGLOBIN    Collection Time: 07/11/18  5:14 AM   Result Value Ref Range    HGB 10.2 (L) 11.7 - 15.4 g/dL   PLEASE READ & DOCUMENT PPD TEST IN 72 HRS    Collection Time: 07/11/18 10:00 AM   Result Value Ref Range    PPD  Negative    mm Induration  mm   HEMOGLOBIN    Collection Time: 07/12/18  4:54 AM   Result Value Ref Range    HGB 10.4 (L) 11.7 - 15.4 g/dL       PT Initial  PT Most Recent   AROM: Generally decreased, functional (left LE) (07/09/18 1500) Generally decreased, functional (left LE) (07/09/18 1500)         Strength: Generally decreased, functional (left LE) (07/09/18 1500) Generally decreased, functional (left LE) (07/09/18 1500)   Coordination: Generally decreased, functional (left LE) (07/09/18 1500) Generally decreased, functional (left LE) (07/09/18 1500)                     Distance (ft): 10 Feet (ft) (07/09/18 1500) 25 Feet (ft) (07/11/18 1500)   Assistive Device: Walker, rolling (07/10/18 1100) Walker, rolling (07/11/18 1500)       OT Initial OT Most Recent                                               ST Initial ST Most Recent                        Active Problems:    HTN (hypertension) (8/21/2013)      Congestive heart failure ([address] (3/14/2017)      OA (osteoarthritis) of knee (7/9/2018)          Condition on discharge :  good  Rehabilitation  potential : Good     Goals in Rehab : Patient to reach maximal rehabilitation potential in functional mobility,ambulation and ADL/ self care skills ; to improve strength and endurance    Expected Length Of Stay : Depending on pace of progress in mobility and self care in PT and OT ,therapies    Discharge Instructions:  Rehabilitation Management/ Medical Management: 1. Devices:Walkers, Type: Rolling Walker  2. Consult:Rehab team including PT, OT,  and . 3. Disposition Rehab-discussed with patient. 4. Thigh-high or knee-high YOLANDE's when out of bed. 5. DVT Prophylaxis - aspirin 81mg bid x 30days. Please notify surgeon at [address] if DVT diagnosed. 6. Incentive spirometer Q1H while awake  7. Post op hemorrhagic anemia- monitor. 8. Activity: WBAT RLE  9. Planned Labs: CBC,BMP  10. Pain Control: fair control. Continue scheduled tylenol, celebrex and  PRN meds. Continue current management. 11. Wound Care: May remove Aquacel 1 week post op and replace with Tegaderm and sterile gauze dressing. Keep wound clean and dry and reinforce dressing PRN. Remove staples 12-14 post surgery, when incision appears appropriately closed and apply benzoin and 1/2\" steristrips. 12. In case of complications: Please notify surgeon at [address] if suspect infection, cellulitis, wound dehiscence or instrument failure, and if considering starting antibiotic. Follow up with ORTHO per instructions. [address] [place] [address] [phone]        Discharge Medications:       acetaminophen (TYLENOL) tablet 1,000 mg Ordered Dose: 1,000 mg Route: Oral Frequency: EVERY 8 HOURS x 1 week then change to prn. Current Discharge Medication List      START taking these medications    Details           HYDROcodone-acetaminophen (NORCO) 7.5-325 mg per tablet Take 1 Tab by mouth every four (4) hours as needed. Max Daily Amount: 6 Tabs. Associated Diagnoses: Primary osteoarthritis of right knee      ondansetron (ZOFRAN ODT) 8 mg disintegrating tablet Take 1 Tab by mouth every eight (8) hours as needed for Nausea. senna-docusate (PERICOLACE) 8.6-50 mg per tablet Take 2 Tabs by mouth daily. CONTINUE these medications which have CHANGED    Details   aspirin delayed-release 81 mg tablet Take 1 Tab by mouth EVERY 12 HOURS x 30 days then stop. Take with food or milk or full glass of water. CONTINUE these medications which have NOT CHANGED    Details   mupirocin calcium (BACTROBAN) 2 % nasal ointment by Both Nostrils route two (2) times a day. ALBUTEROL IN by Nebulization route daily as needed. theophylline ER,12 hour, (THEOCHRON) 300 mg tablet Take 300 mg by mouth two (2) times a day. montelukast (SINGULAIR) 10 mg tablet Take 1 Tab by mouth nightly. atorvastatin (LIPITOR) 20 mg tablet Take 1 Tab by mouth daily. LORazepam (ATIVAN) 0.5 mg tablet Take 1 Tab by mouth nightly. Max Daily Amount: 0.5 mg.         omeprazole (PRILOSEC) 20 mg capsule Take 1 Cap by mouth daily. levothyroxine (SYNTHROID) 50 mcg tablet Take 1 Tab by mouth Daily (before breakfast). lisinopril (PRINIVIL, ZESTRIL) 10 mg tablet Take 1 Tab by mouth daily. Qty: 90 Tab, Refills: 3      furosemide (LASIX) 20 mg tablet Take 1 tablet daily, alternate with 1/2 tablet daily. metoprolol tartrate (LOPRESSOR) 25 mg tablet Take 1 Tab by mouth two (2) times a day. magnesium 250 mg tab Take 250 mg by mouth daily. calcium carbonate (OS-EMERITA) 500 mg calcium (1,250 mg) tablet Take 1 Tab by mouth two (2) times a day. VITAMIN E, DL,TOCOPHERYL ACET, (VITAMIN E ACETATE) 400 unit cap capsule Take 400 Units by mouth daily. Cholecalciferol, Vitamin D3, (VITAMIN D3) 1,000 unit cap Take 1 Tab by mouth daily. albuterol (VENTOLIN HFA) 90 mcg/actuation inhaler Take 2 Puffs by inhalation every four (4) hours as needed for Wheezing or Shortness of Breath.   Indications: Acute Asthma Attack, Chronic Obstructive Pulmonary Disease      fluticasone-vilanterol (BREO ELLIPTA) 200-25 mcg/dose inhaler Take 1 Puff by inhalation daily. RINSE MOUTH WELL AFTER USE         denosumab (PROLIA) 60 mg/mL injection 1 mL by SubCUTAneous route once for 1 dose. Every 6 months, last shot 6/9/16        May draw labs if needed     Original order is good for 6 mths  Indications: POST-MENOPAUSAL OSTEOPOROSIS       Associated Diagnoses: Osteoporosis, postmenopausal             Discharge time: 35 minutes.     Signed By: Carol Harris MD     July 12, 2018

## 2018-07-12 NOTE — PROGRESS NOTES
2018         Post Op day: 3 Days Post-Op     Admit Date: 2018  Admit Diagnosis: Osteoarthritis of right knee, unspecified osteoarthritis type [M17.11]        Subjective: Patient stable. No acute events. Objective:   Visit Vitals    /72 (BP 1 Location: Left arm, BP Patient Position: At rest)    Pulse 66    Temp 98.4 °F (36.9 °C)    Resp 17    SpO2 98%    Temp (24hrs), Av.4 °F (36.9 °C), Min:98.1 °F (36.7 °C), Max:98.6 °F (37 °C)    Lab Results   Component Value Date/Time    HGB 10.4 (L) 2018 04:54 AM     Extremity Exam  Dressing clean and dry   Tibialis Anterior and Gastroc-Soleus functioning normally right lower extremity  Sensation intact to light touch on operative limb  Extremity perfused  TEDS/SCDS in place  No sign of DVT     Assessment / Plan :  WBAT RLE  Continue PT/OT  Continue current DVT prophylaxis in house. Discharge on ASA BID  DIspo-SNF  Patient Active Problem List   Diagnosis Code    Simple chronic bronchitis (HCC) J41.0    HTN (hypertension) I10    Pure hypercholesterolemia E78.00    History of breast cancer Z85.3    Osteoporosis M81.0    Memory loss R41.3    Hallucinations, visual R44.1    Osteoarthritis of both knees M17.0    Chronic kidney disease, stage III (moderate) N18.3    H/O splenectomy Z90.81    Chronic pain syndrome G89.4    Congestive heart failure (HCC) I50.9    Nonsustained ventricular tachycardia (HCC) I47.2    Abnormal urinalysis R82.90    Baseline forced expiratory volume at end of one second (FEV1) 30% to 80% predicted Z78.9    OA (osteoarthritis) of knee M17.10          Signed By: Jesus Bee MD     I have reviewed the patients controlled substance prescription history, as maintained in the [address] [place] [address] prescription monitoring program, so that the prescription(s) for a  controlled substance can be given.

## 2018-07-12 NOTE — PROGRESS NOTES
Problem: Mobility Impaired (Adult and Pediatric)  Goal: *Acute Goals and Plan of Care (Insert Text)  GOALS (1-4 days):  (1.)Ms. Shad Vo will move from supine to sit and sit to supine  in bed with SUPERVISION. (2.)Ms. Shad Vo will transfer from bed to chair and chair to bed with STAND BY ASSIST using the least restrictive device. (3.)Ms. Shad Vo will ambulate with STAND BY ASSIST for  feet with the least restrictive device. (4.)Ms. Shad Vo will ambulate up/down 1 steps with No railing with MINIMAL ASSIST with walker. (5.)Ms. Shad Vo will increase right knee ROM to 5°-80°.  ________________________________________________________________________________________________    PHYSICAL THERAPY [facility]Ka: Daily Note, AM 7/12/2018  INPATIENT: Hospital Day: 4  [place] / [place] / Product Type: Medicare /      NAME/AGE/GENDER: Si Level is a 80 y.o. female   PRIMARY DIAGNOSIS:  Osteoarthritis of right knee, unspecified osteoarthritis type [M17.11]   Procedure(s) and Anesthesia Type:     * RIGHT KNEE ARTHROPLASTY TOTAL SOUMYA CEMENTED / ANCEF 2gm / Marie Frio - Spinal (Right)  ICD-10: Treatment Diagnosis:    · Pain in Right Knee (M25.561)  · Stiffness of Right Knee, Not elsewhere classified (M25.661)  · Difficulty in walking, Not elsewhere classified (R26.2)      ASSESSMENT:     Ms. Shad Vo presents with impaired strength & mobility s/p right TKA. Pt also had decreased stability during out of bed activity. Pt will benefit from follow up therapy to help restore safe function. Pt. Reports pain a little better today. She increased her gait distance with walker. She did tka exercises with cues and assistance. Good rom. Plans on rehab today. No questions. She was left in the chair with no complaints. This section established at most recent assessment   PROBLEM LIST (Impairments causing functional limitations):  1. Decreased Strength  2.  Decreased ADL/Functional Activities  3. Decreased Transfer Abilities  4. Decreased Ambulation Ability/Technique  5. Decreased Balance  6. Increased Pain  7. Decreased Activity Tolerance  8. Decreased Flexibility/Joint Mobility  9. Decreased [place] with Home Exercise Program   INTERVENTIONS PLANNED: (Benefits and precautions of physical therapy have been discussed with the patient.)  1. Bed Mobility  2. Gait Training  3. Home Exercise Program (HEP)  4. Therapeutic Exercise/Strengthening  5. Transfer Training  6. Range of Motion: active/assisted/passive  7. Therapeutic Activities  8. Group Therapy     TREATMENT PLAN: Frequency/Duration: Follow patient BID for duration of hospital stay to address above goals. Rehabilitation Potential For Stated Goals: Good     RECOMMENDED REHABILITATION/EQUIPMENT: (at time of discharge pending progress): Continue Skilled Therapy and Rehab. HISTORY:   History of Present Injury/Illness (Reason for Referral): The patient has end stage arthritis of the right knee. The patient was evaluated and examined during a consultation prior to today. The patient complains of knee pain with activities, reports pain as mostly occurring along the joint lines, reports stiffness of the knee with prolonged inactivity, and swelling/pain at the end of the day and after increased physical activity. The pain affects the patients activities of daily living and quality of life. The patient has attempted and exhausted conservative treatment. There have been no changes to the patient's orthopedic condition since the last office visit.     Past Medical History/Comorbidities:   Ms. Yan Patterson  has a past medical history of Arthritis; Asthma; Breast CA ([facility] [address] Cardiac arrhythmia (03/2013); Chronic obstructive pulmonary disease ([address] Chronic pain; Former smoker; GERD (gastroesophageal reflux disease); Heart failure ([address] Heart murmur; High cholesterol; Hypertension;  Incontinence in female; Migraine; Mild aortic stenosis; Oxygen dependent; and Thyroid disease. She also has no past medical history of Adverse effect of anesthesia; Aneurysm ([facility] [address] Autoimmune disease ([facility] [address] CAD (coronary artery disease); Chronic kidney disease; Coagulation disorder ([facility] [address] Diabetes ([facility] [address] Difficult intubation; Endocarditis; Ill-defined condition; Liver disease; Malignant hyperthermia due to anesthesia; Morbid obesity ([facility] [address] Nausea & vomiting; Nicotine vapor product user; Non-nicotine vapor product user; Pseudocholinesterase deficiency; Psychiatric disorder; PUD (peptic ulcer disease); Rheumatic fever; Seizures ([facility] [address] Sleep apnea; Stroke [facility]); or Thromboembolus ([facility] [address] Ms. Azucena Lam  has a past surgical history that includes hx hysterectomy; hx appendectomy; hx splenectomy (1998); hx cholecystectomy; hx breast lumpectomy (Right, 10/31/2012); hx open reduction internal fixation (Right, 05/31/2018); hx other surgical; and hx knee arthroscopy (Right). Social History/Living Environment:   Home Environment: Private residence  # Steps to Enter: 1  Rails to Enter: No  One/Two Story Residence: One story  Living Alone: No  Support Systems: Family member(s)  Patient Expects to be Discharged to[de-identified] Skilled nursing facility  Current DME Used/Available at Home: None  Tub or Shower Type: Tub/Shower combination  Prior Level of Function/Work/Activity:  Pt was non-ambulatory for the last 6 weeks, but the last 2 weeks of this 6 week period prior to this admission, this pt was needing SBA for transfers only.    Number of Personal Factors/Comorbidities that affect the Plan of Care: 3+: HIGH COMPLEXITY   EXAMINATION:   Most Recent Physical Functioning:               RLE AROM  R Knee Flexion: 91  R Knee Extension: 5            Bed Mobility  Supine to Sit: Minimum assistance    Transfers  Sit to Stand: Contact guard assistance;Minimum assistance  Stand to Sit: Contact guard assistance  Bed to Chair: Contact guard assistance;Minimum assistance    Balance  Sitting: Intact  Standing: With support              Weight Bearing Status  Right Side Weight Bearing: As tolerated  Distance (ft): 90 Feet (ft)  Ambulation - Level of Assistance: Contact guard assistance  Assistive Device: Walker, rolling  Speed/Alissa: Delayed  Step Length: Left shortened;Right shortened  Stance: Right decreased  Gait Abnormalities: Antalgic  Interventions: Safety awareness training;Verbal cues     Braces/Orthotics: none    Right Knee Cold  Type: Cryocuff      Body Structures Involved:  1. Joints  2. Muscles Body Functions Affected:  1. Sensory/Pain  2. Movement Related Activities and Participation Affected:  1. General Tasks and Demands  2. Mobility   Number of elements that affect the Plan of Care: 3: MODERATE COMPLEXITY   CLINICAL PRESENTATION:   Presentation: Evolving clinical presentation with unstable and unpredictable characteristics: HIGH COMPLEXITY   CLINICAL DECISION MAKING:   [facility] [facility]   Basic Mobility Inpatient Short Form  How much difficulty does the patient currently have. .. Unable A Lot A Little None   1. Turning over in bed (including adjusting bedclothes, sheets and blankets)? [] 1   [] 2   [x] 3   [] 4   2. Sitting down on and standing up from a chair with arms ( e.g., wheelchair, bedside commode, etc.)   [] 1   [] 2   [x] 3   [] 4   3. Moving from lying on back to sitting on the side of the bed? [] 1   [] 2   [x] 3   [] 4   How much help from another person does the patient currently need. .. Total A Lot A Little None   4. Moving to and from a bed to a chair (including a wheelchair)? [] 1   [] 2   [x] 3   [] 4   5. Need to walk in hospital room? [] 1   [] 2   [x] 3   [] 4   6. Climbing 3-5 steps with a railing? [x] 1   [] 2   [] 3   [] 4   © 2007, Trustees of [facility] [employer], under license to [employer]).  All rights reserved        Score:  Initial: 16 Most Recent: X (Date: -- )    Interpretation of Tool:  Represents activities that are increasingly more difficult (i.e. Bed mobility, Transfers, Gait). Score 24 23 22-20 19-15 14-10 9-7 6     Modifier CH CI CJ CK CL CM CN      ? Mobility - Walking and Moving Around:     - CURRENT STATUS: CK - 40%-59% impaired, limited or restricted    - GOAL STATUS: CJ - 20%-39% impaired, limited or restricted    - D/C STATUS:  ---------------To be determined---------------  [place] / [place] / Product Type: Medicare /      Medical Necessity:     · Patient is expected to demonstrate progress in strength, range of motion, balance, coordination and functional technique to decrease assistance required with bed mobility, transfers & gait. Reason for Services/Other Comments:  · Patient continues to require skilled intervention due to pt unsafe with functional mobility. Use of outcome tool(s) and clinical judgement create a POC that gives a: Difficult prediction of patient's progress: HIGH COMPLEXITY            TREATMENT:   (In addition to Assessment/Re-Assessment sessions the following treatments were rendered)     Pre-treatment Symptoms/Complaints:  Knee pain  Pain Initial:      Post Session:  7 with activity     Therapeutic Exercise: (15 Minutes):  Exercises per grid below to improve mobility and dynamic movement of leg - right to improve functional endurance. Required minimal verbal cues to promote proper body alignment and promote proper body mechanics. Progressed range and repetitions as indicated. Gait Training (10 Minutes):  Gait training to improve and/or restore physical functioning as related to mobility and strength. Ambulated 90 Feet (ft) with Contact guard assistance using a Walker, rolling and minimal Safety awareness training;Verbal cues related to their stance phase and stride length to promote proper body alignment and promote proper body mechanics. Instruction in performance of walker use and gait sequencing to correct stance phase and stride length. Date:  7/9 Date:  7/10/18 Date:  7/11    ACTIVITY/EXERCISE AM PM AM PM AM PM 7/12 am   GROUP THERAPY  []  []  []  [x]  [x]  []    Ankle Pumps  10 10 15 20a 20a 20a   Quad Sets  10 10 15 20a 20a 20a   Gluteal Sets  10 10 15 20a 20a 20a   Hip ABd/ADduction  10 15 15 20a 20a 20a   Straight Leg Raises  10 15 15 20aa 20a 20aa   Knee Slides  10 15 15 20aa 20aa 20aa   Short Arc Quads  10  15 20aa 20aa 20aa   Long Arc Quads          Chair Slides    15 20aa 20a 20a             B = bilateral; AA = active assistive; A = active; P = passive      Treatment/Session Assessment:     Response to Treatment:  Pt. Making slow but steady progress. Education:  [x] Home Exercises  [x] Fall Precautions  [x] no pillow under knee [x] D/C Instruction Review  [x] Knee Prosthesis Review  [x] Walker Management/Safety [] Adaptive Equipment as Needed       Interdisciplinary Collaboration:   o Physical Therapist    After treatment position/precautions:   o Up in chair  o Bed/Chair-wheels locked  o Bed in low position  o Caregiver at bedside  o Call light within reach  o Family at bedside    Compliance with Program/Exercises: compliant all the time. Recommendations/Intent for next treatment session:  Treatment next visit will focus on increasing Ms. Vila's independence with bed mobility, transfers, gait training, strength/ROM exercises, modalities for pain, and patient education.       Total Treatment Duration:  PT Patient Time In/Time Out  Time In: 1020  Time Out: [address], PT

## 2018-07-12 NOTE — PROGRESS NOTES
Pt report called to [facility] at [facility]. Pt left hospital via w/c with daughter and staff member. Pt assessment unchanged. Pt given percocet for pain prior to discharge. No changes in pt assessment.

## 2018-07-13 ENCOUNTER — PATIENT OUTREACH (OUTPATIENT)
Dept: CASE MANAGEMENT | Age: 83
End: 2018-07-13

## 2018-07-13 NOTE — PROGRESS NOTES
This note will not be viewable in [address] Patient discharged to [facility] 7/12/18. Patient discharged to a [facility] facility. Patient will be included in weekly care coordination calls. Information forwarded to Jessica Hernandez, [facility] Provider [facility] RN Care Manager.

## 2018-07-24 ENCOUNTER — HOME HEALTH ADMISSION (OUTPATIENT)
Dept: HOME HEALTH SERVICES | Facility: HOME HEALTH | Age: 83
End: 2018-07-24
Payer: MEDICARE

## 2018-07-25 ENCOUNTER — HOME CARE VISIT (OUTPATIENT)
Dept: SCHEDULING | Facility: HOME HEALTH | Age: 83
End: 2018-07-25
Payer: MEDICARE

## 2018-07-25 VITALS
HEIGHT: 64 IN | HEART RATE: 60 BPM | WEIGHT: 138 LBS | SYSTOLIC BLOOD PRESSURE: 128 MMHG | BODY MASS INDEX: 23.56 KG/M2 | TEMPERATURE: 98 F | DIASTOLIC BLOOD PRESSURE: 70 MMHG | RESPIRATION RATE: 18 BRPM | OXYGEN SATURATION: 95 %

## 2018-07-25 PROCEDURE — 400013 HH SOC

## 2018-07-25 PROCEDURE — 3331090002 HH PPS REVENUE DEBIT

## 2018-07-25 PROCEDURE — G0299 HHS/HOSPICE OF RN EA 15 MIN: HCPCS

## 2018-07-25 PROCEDURE — 3331090001 HH PPS REVENUE CREDIT

## 2018-07-26 ENCOUNTER — HOME CARE VISIT (OUTPATIENT)
Dept: SCHEDULING | Facility: HOME HEALTH | Age: 83
End: 2018-07-26
Payer: MEDICARE

## 2018-07-26 VITALS
TEMPERATURE: 97.7 F | RESPIRATION RATE: 16 BRPM | HEART RATE: 74 BPM | SYSTOLIC BLOOD PRESSURE: 120 MMHG | DIASTOLIC BLOOD PRESSURE: 70 MMHG

## 2018-07-26 VITALS
HEART RATE: 75 BPM | SYSTOLIC BLOOD PRESSURE: 120 MMHG | TEMPERATURE: 98.3 F | DIASTOLIC BLOOD PRESSURE: 70 MMHG | RESPIRATION RATE: 16 BRPM

## 2018-07-26 PROCEDURE — 3331090002 HH PPS REVENUE DEBIT

## 2018-07-26 PROCEDURE — G0151 HHCP-SERV OF PT,EA 15 MIN: HCPCS

## 2018-07-26 PROCEDURE — G0152 HHCP-SERV OF OT,EA 15 MIN: HCPCS

## 2018-07-26 PROCEDURE — 3331090001 HH PPS REVENUE CREDIT

## 2018-07-27 PROCEDURE — 3331090002 HH PPS REVENUE DEBIT

## 2018-07-27 PROCEDURE — 3331090001 HH PPS REVENUE CREDIT

## 2018-07-28 PROCEDURE — 3331090001 HH PPS REVENUE CREDIT

## 2018-07-28 PROCEDURE — 3331090002 HH PPS REVENUE DEBIT

## 2018-07-29 PROCEDURE — 3331090002 HH PPS REVENUE DEBIT

## 2018-07-29 PROCEDURE — 3331090001 HH PPS REVENUE CREDIT

## 2018-07-30 ENCOUNTER — HOME CARE VISIT (OUTPATIENT)
Dept: SCHEDULING | Facility: HOME HEALTH | Age: 83
End: 2018-07-30
Payer: MEDICARE

## 2018-07-30 VITALS — TEMPERATURE: 98.6 F | RESPIRATION RATE: 16 BRPM | SYSTOLIC BLOOD PRESSURE: 102 MMHG | DIASTOLIC BLOOD PRESSURE: 60 MMHG

## 2018-07-30 PROCEDURE — G0152 HHCP-SERV OF OT,EA 15 MIN: HCPCS

## 2018-07-30 PROCEDURE — 3331090002 HH PPS REVENUE DEBIT

## 2018-07-30 PROCEDURE — 3331090001 HH PPS REVENUE CREDIT

## 2018-07-31 ENCOUNTER — PATIENT OUTREACH (OUTPATIENT)
Dept: CASE MANAGEMENT | Age: 83
End: 2018-07-31

## 2018-07-31 ENCOUNTER — HOME CARE VISIT (OUTPATIENT)
Dept: SCHEDULING | Facility: HOME HEALTH | Age: 83
End: 2018-07-31
Payer: MEDICARE

## 2018-07-31 VITALS
SYSTOLIC BLOOD PRESSURE: 119 MMHG | DIASTOLIC BLOOD PRESSURE: 73 MMHG | TEMPERATURE: 98 F | RESPIRATION RATE: 16 BRPM | HEART RATE: 74 BPM | OXYGEN SATURATION: 94 %

## 2018-07-31 VITALS
SYSTOLIC BLOOD PRESSURE: 119 MMHG | OXYGEN SATURATION: 94 % | DIASTOLIC BLOOD PRESSURE: 73 MMHG | HEART RATE: 74 BPM | RESPIRATION RATE: 16 BRPM | TEMPERATURE: 98 F

## 2018-07-31 PROCEDURE — 3331090001 HH PPS REVENUE CREDIT

## 2018-07-31 PROCEDURE — G0157 HHC PT ASSISTANT EA 15: HCPCS

## 2018-07-31 PROCEDURE — G0299 HHS/HOSPICE OF RN EA 15 MIN: HCPCS

## 2018-07-31 PROCEDURE — 3331090002 HH PPS REVENUE DEBIT

## 2018-07-31 NOTE — PROGRESS NOTES
This note will not be viewable in [address] [facility] follow up call S/P rehab discharge. Spoke with patient's granddaughter, Kiara Erwin, who reports patient is doing well.  OT has discharged patient, nut RN and PT continue to see patient. Mllinda Ochoa reports patient is transitioning back to previous activity level in her home. Patient is living with Ml Ochoa for now and may return to her home after recovery. No problems with obtaining medications or transportation. Patient has follow up scheduled as directed at DC from SNF. No concerns or needs identified, will close case as patient is progessing well with no needs identified and no further [facility] outreach indicated.

## 2018-08-01 PROCEDURE — 3331090002 HH PPS REVENUE DEBIT

## 2018-08-01 PROCEDURE — 3331090001 HH PPS REVENUE CREDIT

## 2018-08-02 ENCOUNTER — HOME CARE VISIT (OUTPATIENT)
Dept: SCHEDULING | Facility: HOME HEALTH | Age: 83
End: 2018-08-02
Payer: MEDICARE

## 2018-08-02 VITALS
TEMPERATURE: 97.1 F | RESPIRATION RATE: 18 BRPM | HEART RATE: 77 BPM | SYSTOLIC BLOOD PRESSURE: 118 MMHG | DIASTOLIC BLOOD PRESSURE: 60 MMHG

## 2018-08-02 VITALS
SYSTOLIC BLOOD PRESSURE: 118 MMHG | HEART RATE: 77 BPM | DIASTOLIC BLOOD PRESSURE: 60 MMHG | RESPIRATION RATE: 18 BRPM | TEMPERATURE: 97.1 F

## 2018-08-02 PROCEDURE — 3331090002 HH PPS REVENUE DEBIT

## 2018-08-02 PROCEDURE — G0299 HHS/HOSPICE OF RN EA 15 MIN: HCPCS

## 2018-08-02 PROCEDURE — 3331090001 HH PPS REVENUE CREDIT

## 2018-08-02 PROCEDURE — G0157 HHC PT ASSISTANT EA 15: HCPCS

## 2018-08-03 PROCEDURE — 3331090001 HH PPS REVENUE CREDIT

## 2018-08-03 PROCEDURE — 3331090002 HH PPS REVENUE DEBIT

## 2018-08-04 PROCEDURE — 3331090001 HH PPS REVENUE CREDIT

## 2018-08-04 PROCEDURE — 3331090002 HH PPS REVENUE DEBIT

## 2018-08-05 PROCEDURE — 3331090002 HH PPS REVENUE DEBIT

## 2018-08-05 PROCEDURE — 3331090001 HH PPS REVENUE CREDIT

## 2018-08-06 ENCOUNTER — HOME CARE VISIT (OUTPATIENT)
Dept: SCHEDULING | Facility: HOME HEALTH | Age: 83
End: 2018-08-06
Payer: MEDICARE

## 2018-08-06 VITALS
RESPIRATION RATE: 19 BRPM | OXYGEN SATURATION: 99 % | SYSTOLIC BLOOD PRESSURE: 112 MMHG | TEMPERATURE: 96.1 F | HEART RATE: 68 BPM | DIASTOLIC BLOOD PRESSURE: 62 MMHG

## 2018-08-06 PROCEDURE — 3331090001 HH PPS REVENUE CREDIT

## 2018-08-06 PROCEDURE — G0157 HHC PT ASSISTANT EA 15: HCPCS

## 2018-08-06 PROCEDURE — 3331090002 HH PPS REVENUE DEBIT

## 2018-08-07 PROCEDURE — 3331090002 HH PPS REVENUE DEBIT

## 2018-08-07 PROCEDURE — 3331090001 HH PPS REVENUE CREDIT

## 2018-08-08 ENCOUNTER — HOME CARE VISIT (OUTPATIENT)
Dept: HOME HEALTH SERVICES | Facility: HOME HEALTH | Age: 83
End: 2018-08-08
Payer: MEDICARE

## 2018-08-08 PROCEDURE — 3331090002 HH PPS REVENUE DEBIT

## 2018-08-08 PROCEDURE — 3331090001 HH PPS REVENUE CREDIT

## 2018-08-09 ENCOUNTER — HOME CARE VISIT (OUTPATIENT)
Dept: SCHEDULING | Facility: HOME HEALTH | Age: 83
End: 2018-08-09
Payer: MEDICARE

## 2018-08-09 VITALS
TEMPERATURE: 97.3 F | HEART RATE: 80 BPM | RESPIRATION RATE: 19 BRPM | SYSTOLIC BLOOD PRESSURE: 118 MMHG | DIASTOLIC BLOOD PRESSURE: 58 MMHG | OXYGEN SATURATION: 96 %

## 2018-08-09 VITALS
SYSTOLIC BLOOD PRESSURE: 96 MMHG | DIASTOLIC BLOOD PRESSURE: 52 MMHG | RESPIRATION RATE: 16 BRPM | TEMPERATURE: 98.1 F | HEART RATE: 82 BPM | OXYGEN SATURATION: 96 %

## 2018-08-09 PROCEDURE — G0299 HHS/HOSPICE OF RN EA 15 MIN: HCPCS

## 2018-08-09 PROCEDURE — G0157 HHC PT ASSISTANT EA 15: HCPCS

## 2018-08-09 PROCEDURE — 3331090002 HH PPS REVENUE DEBIT

## 2018-08-09 PROCEDURE — 3331090001 HH PPS REVENUE CREDIT

## 2018-08-10 PROCEDURE — 3331090002 HH PPS REVENUE DEBIT

## 2018-08-10 PROCEDURE — 3331090001 HH PPS REVENUE CREDIT

## 2018-08-11 PROCEDURE — 3331090002 HH PPS REVENUE DEBIT

## 2018-08-11 PROCEDURE — 3331090001 HH PPS REVENUE CREDIT

## 2018-08-12 PROCEDURE — 3331090002 HH PPS REVENUE DEBIT

## 2018-08-12 PROCEDURE — 3331090001 HH PPS REVENUE CREDIT

## 2018-08-13 PROCEDURE — 3331090001 HH PPS REVENUE CREDIT

## 2018-08-13 PROCEDURE — 3331090002 HH PPS REVENUE DEBIT

## 2018-08-14 PROCEDURE — 3331090001 HH PPS REVENUE CREDIT

## 2018-08-14 PROCEDURE — 3331090002 HH PPS REVENUE DEBIT

## 2018-08-15 ENCOUNTER — HOME CARE VISIT (OUTPATIENT)
Dept: SCHEDULING | Facility: HOME HEALTH | Age: 83
End: 2018-08-15
Payer: MEDICARE

## 2018-08-15 PROCEDURE — G0151 HHCP-SERV OF PT,EA 15 MIN: HCPCS

## 2018-08-15 PROCEDURE — 3331090002 HH PPS REVENUE DEBIT

## 2018-08-15 PROCEDURE — 3331090001 HH PPS REVENUE CREDIT

## 2018-08-16 ENCOUNTER — HOME CARE VISIT (OUTPATIENT)
Dept: SCHEDULING | Facility: HOME HEALTH | Age: 83
End: 2018-08-16
Payer: MEDICARE

## 2018-08-16 VITALS
HEART RATE: 71 BPM | SYSTOLIC BLOOD PRESSURE: 112 MMHG | DIASTOLIC BLOOD PRESSURE: 70 MMHG | TEMPERATURE: 97.5 F | RESPIRATION RATE: 18 BRPM

## 2018-08-16 VITALS
RESPIRATION RATE: 18 BRPM | TEMPERATURE: 98.4 F | SYSTOLIC BLOOD PRESSURE: 98 MMHG | HEART RATE: 72 BPM | OXYGEN SATURATION: 93 % | DIASTOLIC BLOOD PRESSURE: 56 MMHG

## 2018-08-16 PROCEDURE — 3331090001 HH PPS REVENUE CREDIT

## 2018-08-16 PROCEDURE — 3331090002 HH PPS REVENUE DEBIT

## 2018-08-16 PROCEDURE — G0299 HHS/HOSPICE OF RN EA 15 MIN: HCPCS

## 2018-08-17 PROCEDURE — 3331090002 HH PPS REVENUE DEBIT

## 2018-08-17 PROCEDURE — 3331090001 HH PPS REVENUE CREDIT

## 2018-08-18 PROCEDURE — 3331090001 HH PPS REVENUE CREDIT

## 2018-08-18 PROCEDURE — 3331090002 HH PPS REVENUE DEBIT

## 2018-08-19 PROCEDURE — 3331090001 HH PPS REVENUE CREDIT

## 2018-08-19 PROCEDURE — 3331090002 HH PPS REVENUE DEBIT

## 2019-01-07 PROBLEM — R09.02 HYPOXIA: Status: ACTIVE | Noted: 2019-01-07

## 2019-01-07 PROBLEM — J44.9 CHRONIC OBSTRUCTIVE PULMONARY DISEASE (HCC): Status: ACTIVE | Noted: 2019-01-07

## 2021-12-05 NOTE — PERIOP NOTES
Monitor Summary:  /ST 's  .16/.08/.38         TRANSFER - OUT REPORT:    Verbal report given to Sagar Sierra RN on Christiano Farooq  being transferred to  for routine post - op       Report consisted of patients Situation, Background, Assessment and   Recommendations(SBAR). Information from the following report(s) OR Summary, Procedure Summary, Intake/Output and MAR was reviewed with the receiving nurse. Opportunity for questions and clarification was provided. Patient transported on room air.

## (undated) DEVICE — Z DISCONTINUED USE 2423037 APPLICATOR MEDICATED 3 CC CHLORHEXIDINE GLUC 2% CHLORAPREP

## (undated) DEVICE — FAN SPRAY KIT: Brand: PULSAVAC®

## (undated) DEVICE — (D)PREP SKN CHLRAPRP APPL 26ML -- CONVERT TO ITEM 371833

## (undated) DEVICE — DRAPE XR C ARM 41X74IN LF --

## (undated) DEVICE — Z DISCONTINUED PER MEDLINE USE 2741944 DRESSING AQUACEL 12 IN SURG W9XL30CM SIL CVR WTRPRF VIR BACT BARR ANTIMIC

## (undated) DEVICE — T4 HOOD

## (undated) DEVICE — PACK PROCEDURE SURG TOT KNEE

## (undated) DEVICE — K WIRE FIX L150MM DIA1.8MM S STL W/ TRCR PNT
Type: IMPLANTABLE DEVICE | Site: WRIST | Status: NON-FUNCTIONAL
Removed: 2018-05-31

## (undated) DEVICE — STERILE HOOK LOCK LATEX FREE ELASTIC BANDAGE 3INX5YD: Brand: HOOK LOCK™

## (undated) DEVICE — DRAPE,TOP,102X53,STERILE: Brand: MEDLINE

## (undated) DEVICE — NEEDLE SPNL 22GA L3.5IN BLK HUB S STL REG WALL FIT STYL W/

## (undated) DEVICE — NEEDLE HYPO 21GA L1.5IN INTRAMUSCULAR S STL LATCH BVL UP

## (undated) DEVICE — DRAPE, FILM SHEET, 44X65 STERILE: Brand: MEDLINE

## (undated) DEVICE — 2.0MM DRILL BIT/QC/100MM

## (undated) DEVICE — SUTURE FIBERWIRE SZ 2 W/ TAPERED NEEDLE BLUE L38IN NONABSORB BLU L26.5MM 1/2 CIRCLE AR7200

## (undated) DEVICE — 3000CC GUARDIAN II: Brand: GUARDIAN

## (undated) DEVICE — SUTURE MCRYL SZ 3-0 L27IN ABSRB UD L24MM PS-1 3/8 CIR PRIM Y936H

## (undated) DEVICE — SUT PROL 3-0 18IN PS2 BLU --

## (undated) DEVICE — AMD ANTIMICROBIAL GAUZE SPONGES,12 PLY USP TYPE VII, 0.2% POLYHEXAMETHYLENE BIGUANIDE HCI (PHMB): Brand: CURITY

## (undated) DEVICE — SLING ORTH AD L50IN 90 225LB EL TO WRST SFT COT SPANDEX

## (undated) DEVICE — TRAY PREP DRY W/ PREM GLV 2 APPL 6 SPNG 2 UNDPD 1 OVERWRAP

## (undated) DEVICE — SUTURE STRATAFIX SPRL SZ 1 L5IN ABSRB VLT CT-1 L36MM 1/2 SXPD2B401

## (undated) DEVICE — BIPOLAR FORCEPS CORD,BANANA LEADS: Brand: VALLEYLAB

## (undated) DEVICE — BIPOLAR SEALER 23-313-1 AQM 9.5XL: Brand: AQUAMANTYS ®

## (undated) DEVICE — DRAPE,HAND,STERILE: Brand: MEDLINE

## (undated) DEVICE — 3M™ TEGADERM™ TRANSPARENT FILM DRESSING FRAME STYLE, 1627, 4 IN X 10 IN (10 CM X 25 CM), 20/CT 4CT/CASE: Brand: 3M™ TEGADERM™

## (undated) DEVICE — REM POLYHESIVE ADULT PATIENT RETURN ELECTRODE: Brand: VALLEYLAB

## (undated) DEVICE — SYR LR LCK 1ML GRAD NSAF 30ML --

## (undated) DEVICE — STERILE PRESSURE PROTECTOR PAD® FOR DE MAYO UNIVERSAL DISTRACTOR® (10/CASE): Brand: DE MAYO UNIVERSAL DISTRACTOR®

## (undated) DEVICE — 1.8MM DRILL BIT WITH DEPTH MARK/QC/110MM

## (undated) DEVICE — PADDING CAST W2INXL4YD ST COT COHESIVE HND TEARABLE SPEC

## (undated) DEVICE — BANDAGE COMPR 9 FTX4 IN SMOOTH COMFORTABLE SYNTH ESMRK LF

## (undated) DEVICE — STERILE HOOK LOCK LATEX FREE ELASTIC BANDAGE 2INX5YD: Brand: HOOK LOCK™

## (undated) DEVICE — SUTURE NONABSORBABLE MONOFILAMENT 4-0 PS-2 18 IN BLU PROLENE 8682H

## (undated) DEVICE — SURGICAL PROCEDURE PACK BASIC ST FRANCIS

## (undated) DEVICE — MEDI-VAC YANKAUER SUCTION HANDLE W/BULBOUS TIP: Brand: CARDINAL HEALTH

## (undated) DEVICE — 2000CC GUARDIAN II: Brand: GUARDIAN

## (undated) DEVICE — CONTAINER,SPECIMEN,O.R.STRL,4.5OZ: Brand: MEDLINE

## (undated) DEVICE — SUTURE MCRYL SZ 3-0 L27IN ABSRB UD L19MM PS-2 3/8 CIR PRIM Y427H

## (undated) DEVICE — APPLICATOR BNDG 1MM ADH PREMIERPRO EXOFIN

## (undated) DEVICE — PADDING CAST COHESIVE 4 YDX3 IN HND TEARABLE COTTON SPEC 100

## (undated) DEVICE — SUTURE MCRYL SZ 2-0 L27IN ABSRB UD CP-1 1 L36MM 1/2 CIR REV Y266H

## (undated) DEVICE — ZIMMER® STERILE DISPOSABLE TOURNIQUET CUFF WITH PLC, DUAL PORT, SINGLE BLADDER, 18 IN. (46 CM)

## (undated) DEVICE — SYR 10ML LUER LOK 1/5ML GRAD --

## (undated) DEVICE — BUTTON SWITCH PENCIL BLADE ELECTRODE, HOLSTER: Brand: EDGE

## (undated) DEVICE — SOLUTION IV 1000ML 0.9% SOD CHL

## (undated) DEVICE — TRAY CATH 16F DRN BG LTX -- CONVERT TO ITEM 363158

## (undated) DEVICE — SOLUTION IRRIG 3000ML 0.9% SOD CHL FLX CONT 0797208] ICU MEDICAL INC]

## (undated) DEVICE — SPLINT ORTH W3XL12IN RL FRM WRINKLE FREE INTLOK PERFRMANCE

## (undated) DEVICE — INSERT TIB SZ 4 THK11MM KNEE CONVENTIONAL POLYETH CNDYL
Type: IMPLANTABLE DEVICE | Site: KNEE | Status: NON-FUNCTIONAL
Removed: 2018-07-09

## (undated) DEVICE — SYRINGE EAR 2OZ ULC SLIMMER TIP FLAT BTM SUCT PWR DISP FOR